# Patient Record
Sex: MALE | Race: OTHER | URBAN - METROPOLITAN AREA
[De-identification: names, ages, dates, MRNs, and addresses within clinical notes are randomized per-mention and may not be internally consistent; named-entity substitution may affect disease eponyms.]

---

## 2017-09-24 ENCOUNTER — HOSPITAL ENCOUNTER (EMERGENCY)
Age: 22
Discharge: HOME | End: 2017-09-24
Attending: EMERGENCY MEDICINE

## 2017-09-24 ENCOUNTER — APPOINTMENT (OUTPATIENT)
Dept: GENERAL RADIOLOGY | Age: 22
End: 2017-09-24

## 2017-09-24 VITALS
HEART RATE: 76 BPM | HEIGHT: 64 IN | SYSTOLIC BLOOD PRESSURE: 148 MMHG | DIASTOLIC BLOOD PRESSURE: 103 MMHG | WEIGHT: 260 LBS | OXYGEN SATURATION: 98 % | TEMPERATURE: 98.8 F | BODY MASS INDEX: 44.39 KG/M2

## 2017-09-24 DIAGNOSIS — S61.219A LACERATION OF FINGER, INITIAL ENCOUNTER: Primary | ICD-10-CM

## 2017-09-24 PROCEDURE — 12001 RPR S/N/AX/GEN/TRNK 2.5CM/<: CPT

## 2017-09-24 PROCEDURE — 99283 EMERGENCY DEPT VISIT LOW MDM: CPT

## 2017-09-24 PROCEDURE — 73130 X-RAY EXAM OF HAND: CPT

## 2017-09-24 RX ORDER — IBUPROFEN 600 MG/1
600 TABLET ORAL EVERY 6 HOURS PRN
Qty: 30 TABLET | Refills: 0 | Status: SHIPPED | OUTPATIENT
Start: 2017-09-24

## 2017-09-24 RX ORDER — LIDOCAINE HYDROCHLORIDE 10 MG/ML
20 INJECTION, SOLUTION INFILTRATION; PERINEURAL ONCE
Status: DISCONTINUED | OUTPATIENT
Start: 2017-09-24 | End: 2017-09-24 | Stop reason: HOSPADM

## 2017-09-24 ASSESSMENT — ENCOUNTER SYMPTOMS: COLOR CHANGE: 0

## 2017-09-24 ASSESSMENT — PAIN SCALES - GENERAL: PAINLEVEL_OUTOF10: 6

## 2017-09-24 ASSESSMENT — PAIN DESCRIPTION - PAIN TYPE: TYPE: ACUTE PAIN

## 2017-09-24 ASSESSMENT — PAIN DESCRIPTION - ORIENTATION: ORIENTATION: LEFT;MID

## 2017-09-24 ASSESSMENT — PAIN DESCRIPTION - LOCATION: LOCATION: FINGER (COMMENT WHICH ONE)

## 2024-07-03 ENCOUNTER — APPOINTMENT (OUTPATIENT)
Dept: CT IMAGING | Age: 29
End: 2024-07-03

## 2024-07-03 ENCOUNTER — HOSPITAL ENCOUNTER (INPATIENT)
Age: 29
LOS: 2 days | Discharge: HOME | End: 2024-07-06
Attending: EMERGENCY MEDICINE

## 2024-07-03 ENCOUNTER — APPOINTMENT (OUTPATIENT)
Dept: GENERAL RADIOLOGY | Age: 29
End: 2024-07-03

## 2024-07-03 DIAGNOSIS — R56.9 SEIZURE-LIKE ACTIVITY (HCC): Primary | ICD-10-CM

## 2024-07-03 DIAGNOSIS — F41.9 ANXIETY: ICD-10-CM

## 2024-07-03 LAB
ALBUMIN SERPL-MCNC: 5.1 G/DL (ref 3.5–5.2)
ALBUMIN/GLOB SERPL: 1 {RATIO} (ref 1–2.5)
ALP SERPL-CCNC: 126 U/L (ref 40–129)
ALT SERPL-CCNC: 10 U/L (ref 10–50)
AMPHET UR QL SCN: NEGATIVE
ANION GAP SERPL CALCULATED.3IONS-SCNC: 37 MMOL/L (ref 9–16)
APAP SERPL-MCNC: <5 UG/ML (ref 10–30)
AST SERPL-CCNC: 25 U/L (ref 10–50)
BACTERIA URNS QL MICRO: NORMAL
BARBITURATES UR QL SCN: NEGATIVE
BASOPHILS # BLD: 0.53 K/UL (ref 0–0.2)
BASOPHILS NFR BLD: 2 % (ref 0–2)
BENZODIAZ UR QL: NEGATIVE
BILIRUB SERPL-MCNC: 0.2 MG/DL (ref 0–1.2)
BILIRUB UR QL STRIP: NEGATIVE
BUN SERPL-MCNC: 12 MG/DL (ref 6–20)
CA-I BLD-SCNC: 1.37 MMOL/L (ref 1.13–1.33)
CALCIUM SERPL-MCNC: 10.3 MG/DL (ref 8.6–10.4)
CANNABINOIDS UR QL SCN: POSITIVE
CASTS #/AREA URNS LPF: NORMAL /LPF (ref 0–8)
CHLORIDE SERPL-SCNC: 101 MMOL/L (ref 98–107)
CK SERPL-CCNC: 164 U/L (ref 39–308)
CLARITY UR: CLEAR
CO2 SERPL-SCNC: 6 MMOL/L (ref 20–31)
COCAINE UR QL SCN: NEGATIVE
COLOR UR: YELLOW
CREAT SERPL-MCNC: 1.1 MG/DL (ref 0.7–1.2)
EOSINOPHIL # BLD: 0.27 K/UL (ref 0–0.4)
EOSINOPHILS RELATIVE PERCENT: 1 % (ref 1–4)
EPI CELLS #/AREA URNS HPF: NORMAL /HPF (ref 0–5)
ERYTHROCYTE [DISTWIDTH] IN BLOOD BY AUTOMATED COUNT: 12.9 % (ref 11.8–14.4)
ETHANOL PERCENT: <0.01 %
ETHANOLAMINE SERPL-MCNC: <10 MG/DL (ref 0–0.08)
FENTANYL UR QL: NEGATIVE
GFR, ESTIMATED: 90 ML/MIN/1.73M2
GLUCOSE BLD-MCNC: 236 MG/DL (ref 75–110)
GLUCOSE SERPL-MCNC: 261 MG/DL (ref 74–99)
GLUCOSE UR STRIP-MCNC: NEGATIVE MG/DL
HCT VFR BLD AUTO: 58.4 % (ref 40.7–50.3)
HGB BLD-MCNC: 17.4 G/DL (ref 13–17)
HGB UR QL STRIP.AUTO: ABNORMAL
IMM GRANULOCYTES # BLD AUTO: 0.27 K/UL (ref 0–0.3)
IMM GRANULOCYTES NFR BLD: 1 %
KETONES UR STRIP-MCNC: ABNORMAL MG/DL
LACTIC ACID, WHOLE BLOOD: 27 MMOL/L (ref 0.7–2.1)
LEUKOCYTE ESTERASE UR QL STRIP: NEGATIVE
LIPASE SERPL-CCNC: 15 U/L (ref 13–60)
LYMPHOCYTES NFR BLD: 7.69 K/UL (ref 1–4.8)
LYMPHOCYTES RELATIVE PERCENT: 29 % (ref 24–44)
MCH RBC QN AUTO: 27.8 PG (ref 25.2–33.5)
MCHC RBC AUTO-ENTMCNC: 29.8 G/DL (ref 28.4–34.8)
MCV RBC AUTO: 93.3 FL (ref 82.6–102.9)
METHADONE UR QL: NEGATIVE
MONOCYTES NFR BLD: 0.53 K/UL (ref 0.1–0.8)
MONOCYTES NFR BLD: 2 % (ref 1–7)
MORPHOLOGY: NORMAL
MYOGLOBIN SERPL-MCNC: 298 NG/ML (ref 28–72)
NEUTROPHILS NFR BLD: 65 % (ref 36–66)
NEUTS SEG NFR BLD: 17.21 K/UL (ref 1.8–7.7)
NITRITE UR QL STRIP: NEGATIVE
NRBC BLD-RTO: 0 PER 100 WBC
OPIATES UR QL SCN: NEGATIVE
OXYCODONE UR QL SCN: NEGATIVE
PCP UR QL SCN: NEGATIVE
PH UR STRIP: 5 [PH] (ref 5–8)
PLATELET # BLD AUTO: 513 K/UL (ref 138–453)
PMV BLD AUTO: 9.4 FL (ref 8.1–13.5)
POTASSIUM SERPL-SCNC: 3.6 MMOL/L (ref 3.7–5.3)
PROT SERPL-MCNC: 9.4 G/DL (ref 6.6–8.7)
PROT UR STRIP-MCNC: ABNORMAL MG/DL
RBC # BLD AUTO: 6.26 M/UL (ref 4.21–5.77)
RBC #/AREA URNS HPF: NORMAL /HPF (ref 0–4)
SALICYLATES SERPL-MCNC: <0.5 MG/DL (ref 0–10)
SODIUM SERPL-SCNC: 144 MMOL/L (ref 136–145)
SP GR UR STRIP: 1.01 (ref 1–1.03)
TEST INFORMATION: ABNORMAL
TROPONIN I SERPL HS-MCNC: 7 NG/L (ref 0–22)
UROBILINOGEN UR STRIP-ACNC: NORMAL EU/DL (ref 0–1)
WBC #/AREA URNS HPF: NORMAL /HPF (ref 0–5)
WBC OTHER # BLD: 26.5 K/UL (ref 3.5–11.3)

## 2024-07-03 PROCEDURE — 82330 ASSAY OF CALCIUM: CPT

## 2024-07-03 PROCEDURE — 81001 URINALYSIS AUTO W/SCOPE: CPT

## 2024-07-03 PROCEDURE — 80053 COMPREHEN METABOLIC PANEL: CPT

## 2024-07-03 PROCEDURE — 96372 THER/PROPH/DIAG INJ SC/IM: CPT

## 2024-07-03 PROCEDURE — 83874 ASSAY OF MYOGLOBIN: CPT

## 2024-07-03 PROCEDURE — 94761 N-INVAS EAR/PLS OXIMETRY MLT: CPT

## 2024-07-03 PROCEDURE — 80307 DRUG TEST PRSMV CHEM ANLYZR: CPT

## 2024-07-03 PROCEDURE — 93005 ELECTROCARDIOGRAM TRACING: CPT | Performed by: STUDENT IN AN ORGANIZED HEALTH CARE EDUCATION/TRAINING PROGRAM

## 2024-07-03 PROCEDURE — 99285 EMERGENCY DEPT VISIT HI MDM: CPT

## 2024-07-03 PROCEDURE — 5A1935Z RESPIRATORY VENTILATION, LESS THAN 24 CONSECUTIVE HOURS: ICD-10-PCS | Performed by: PSYCHIATRY & NEUROLOGY

## 2024-07-03 PROCEDURE — G0480 DRUG TEST DEF 1-7 CLASSES: HCPCS

## 2024-07-03 PROCEDURE — 82947 ASSAY GLUCOSE BLOOD QUANT: CPT

## 2024-07-03 PROCEDURE — 2500000003 HC RX 250 WO HCPCS

## 2024-07-03 PROCEDURE — 83605 ASSAY OF LACTIC ACID: CPT

## 2024-07-03 PROCEDURE — 2580000003 HC RX 258

## 2024-07-03 PROCEDURE — 96374 THER/PROPH/DIAG INJ IV PUSH: CPT

## 2024-07-03 PROCEDURE — 74177 CT ABD & PELVIS W/CONTRAST: CPT

## 2024-07-03 PROCEDURE — 6360000002 HC RX W HCPCS

## 2024-07-03 PROCEDURE — 2700000000 HC OXYGEN THERAPY PER DAY

## 2024-07-03 PROCEDURE — 0202U NFCT DS 22 TRGT SARS-COV-2: CPT

## 2024-07-03 PROCEDURE — 96376 TX/PRO/DX INJ SAME DRUG ADON: CPT

## 2024-07-03 PROCEDURE — 84484 ASSAY OF TROPONIN QUANT: CPT

## 2024-07-03 PROCEDURE — 6360000004 HC RX CONTRAST MEDICATION

## 2024-07-03 PROCEDURE — 0BH17EZ INSERTION OF ENDOTRACHEAL AIRWAY INTO TRACHEA, VIA NATURAL OR ARTIFICIAL OPENING: ICD-10-PCS | Performed by: PSYCHIATRY & NEUROLOGY

## 2024-07-03 PROCEDURE — 70450 CT HEAD/BRAIN W/O DYE: CPT

## 2024-07-03 PROCEDURE — 83690 ASSAY OF LIPASE: CPT

## 2024-07-03 PROCEDURE — 96375 TX/PRO/DX INJ NEW DRUG ADDON: CPT

## 2024-07-03 PROCEDURE — 85025 COMPLETE CBC W/AUTO DIFF WBC: CPT

## 2024-07-03 PROCEDURE — 71045 X-RAY EXAM CHEST 1 VIEW: CPT

## 2024-07-03 PROCEDURE — 82550 ASSAY OF CK (CPK): CPT

## 2024-07-03 PROCEDURE — 80179 DRUG ASSAY SALICYLATE: CPT

## 2024-07-03 PROCEDURE — 80143 DRUG ASSAY ACETAMINOPHEN: CPT

## 2024-07-03 RX ORDER — LORAZEPAM 2 MG/ML
INJECTION INTRAMUSCULAR
Status: DISPENSED
Start: 2024-07-03 | End: 2024-07-04

## 2024-07-03 RX ORDER — 0.9 % SODIUM CHLORIDE 0.9 %
30 INTRAVENOUS SOLUTION INTRAVENOUS ONCE
Status: COMPLETED | OUTPATIENT
Start: 2024-07-03 | End: 2024-07-03

## 2024-07-03 RX ORDER — PROPOFOL 10 MG/ML
5-50 INJECTION, EMULSION INTRAVENOUS CONTINUOUS
Status: DISCONTINUED | OUTPATIENT
Start: 2024-07-03 | End: 2024-07-04

## 2024-07-03 RX ORDER — PROPOFOL 10 MG/ML
INJECTION, EMULSION INTRAVENOUS
Status: COMPLETED
Start: 2024-07-03 | End: 2024-07-03

## 2024-07-03 RX ORDER — NALOXONE HYDROCHLORIDE 0.4 MG/ML
0.4 INJECTION, SOLUTION INTRAMUSCULAR; INTRAVENOUS; SUBCUTANEOUS ONCE
Status: DISCONTINUED | OUTPATIENT
Start: 2024-07-03 | End: 2024-07-04

## 2024-07-03 RX ADMIN — PROPOFOL 20 MCG/KG/MIN: 10 INJECTION, EMULSION INTRAVENOUS at 22:40

## 2024-07-03 RX ADMIN — LORAZEPAM 2 MG: 2 INJECTION INTRAMUSCULAR; INTRAVENOUS at 21:41

## 2024-07-03 RX ADMIN — LORAZEPAM 4 MG: 2 INJECTION INTRAMUSCULAR; INTRAVENOUS at 22:09

## 2024-07-03 RX ADMIN — Medication 50 MCG/HR: at 23:06

## 2024-07-03 RX ADMIN — LORAZEPAM 2 MG: 2 INJECTION INTRAMUSCULAR; INTRAVENOUS at 21:44

## 2024-07-03 RX ADMIN — IOPAMIDOL 75 ML: 755 INJECTION, SOLUTION INTRAVENOUS at 23:28

## 2024-07-03 RX ADMIN — FENTANYL CITRATE 100 MCG: 50 INJECTION, SOLUTION INTRAMUSCULAR; INTRAVENOUS at 23:46

## 2024-07-03 RX ADMIN — SUCCINYLCHOLINE CHLORIDE 120 MG: 20 INJECTION, SOLUTION INTRAMUSCULAR; INTRAVENOUS at 22:34

## 2024-07-03 RX ADMIN — ETOMIDATE INJECTION 20 MG: 2 SOLUTION INTRAVENOUS at 22:34

## 2024-07-03 RX ADMIN — LORAZEPAM 4 MG: 2 INJECTION INTRAMUSCULAR; INTRAVENOUS at 21:49

## 2024-07-03 RX ADMIN — SODIUM CHLORIDE 3402 ML: 9 INJECTION, SOLUTION INTRAVENOUS at 22:58

## 2024-07-03 ASSESSMENT — PULMONARY FUNCTION TESTS: PIF_VALUE: 22

## 2024-07-04 ENCOUNTER — APPOINTMENT (OUTPATIENT)
Dept: GENERAL RADIOLOGY | Age: 29
End: 2024-07-04

## 2024-07-04 PROBLEM — R56.9 SEIZURE-LIKE ACTIVITY (HCC): Status: ACTIVE | Noted: 2024-07-04

## 2024-07-04 LAB
ALBUMIN SERPL-MCNC: 3.9 G/DL (ref 3.5–5.2)
ALBUMIN/GLOB SERPL: 1 {RATIO} (ref 1–2.5)
ALP SERPL-CCNC: 89 U/L (ref 40–129)
ALT SERPL-CCNC: 8 U/L (ref 10–50)
ANION GAP SERPL CALCULATED.3IONS-SCNC: 12 MMOL/L (ref 9–16)
ANION GAP SERPL CALCULATED.3IONS-SCNC: 13 MMOL/L (ref 9–16)
AST SERPL-CCNC: 30 U/L (ref 10–50)
B PARAP IS1001 DNA NPH QL NAA+NON-PROBE: NOT DETECTED
B PERT DNA SPEC QL NAA+PROBE: NOT DETECTED
B-OH-BUTYR SERPL-MCNC: 0.15 MMOL/L (ref 0.02–0.27)
BASOPHILS # BLD: 0 K/UL (ref 0–0.2)
BASOPHILS NFR BLD: 0 % (ref 0–2)
BILIRUB SERPL-MCNC: <0.2 MG/DL (ref 0–1.2)
BUN SERPL-MCNC: 11 MG/DL (ref 6–20)
BUN SERPL-MCNC: 9 MG/DL (ref 6–20)
C PNEUM DNA NPH QL NAA+NON-PROBE: NOT DETECTED
CALCIUM SERPL-MCNC: 7.5 MG/DL (ref 8.6–10.4)
CALCIUM SERPL-MCNC: 8 MG/DL (ref 8.6–10.4)
CHLORIDE SERPL-SCNC: 107 MMOL/L (ref 98–107)
CHLORIDE SERPL-SCNC: 114 MMOL/L (ref 98–107)
CO2 SERPL-SCNC: 15 MMOL/L (ref 20–31)
CO2 SERPL-SCNC: 18 MMOL/L (ref 20–31)
CREAT SERPL-MCNC: 1 MG/DL (ref 0.7–1.2)
CREAT SERPL-MCNC: 1 MG/DL (ref 0.7–1.2)
EOSINOPHIL # BLD: 0.22 K/UL (ref 0–0.4)
EOSINOPHILS RELATIVE PERCENT: 1 % (ref 1–4)
ERYTHROCYTE [DISTWIDTH] IN BLOOD BY AUTOMATED COUNT: 13.2 % (ref 11.8–14.4)
FIO2: 100
FLUAV RNA NPH QL NAA+NON-PROBE: NOT DETECTED
FLUBV RNA NPH QL NAA+NON-PROBE: NOT DETECTED
GFR, ESTIMATED: >90 ML/MIN/1.73M2
GFR, ESTIMATED: >90 ML/MIN/1.73M2
GLUCOSE BLD-MCNC: 100 MG/DL (ref 75–110)
GLUCOSE BLD-MCNC: 146 MG/DL (ref 74–100)
GLUCOSE BLD-MCNC: 190 MG/DL (ref 74–100)
GLUCOSE BLD-MCNC: 85 MG/DL (ref 75–110)
GLUCOSE BLD-MCNC: 91 MG/DL (ref 75–110)
GLUCOSE BLD-MCNC: 95 MG/DL (ref 75–110)
GLUCOSE SERPL-MCNC: 181 MG/DL (ref 74–99)
GLUCOSE SERPL-MCNC: 89 MG/DL (ref 74–99)
HADV DNA NPH QL NAA+NON-PROBE: NOT DETECTED
HCO3 VENOUS: 15.8 MMOL/L (ref 22–29)
HCOV 229E RNA NPH QL NAA+NON-PROBE: NOT DETECTED
HCOV HKU1 RNA NPH QL NAA+NON-PROBE: NOT DETECTED
HCOV NL63 RNA NPH QL NAA+NON-PROBE: NOT DETECTED
HCOV OC43 RNA NPH QL NAA+NON-PROBE: NOT DETECTED
HCT VFR BLD AUTO: 47 % (ref 40.7–50.3)
HGB BLD-MCNC: 15.7 G/DL (ref 13–17)
HMPV RNA NPH QL NAA+NON-PROBE: NOT DETECTED
HPIV1 RNA NPH QL NAA+NON-PROBE: NOT DETECTED
HPIV2 RNA NPH QL NAA+NON-PROBE: NOT DETECTED
HPIV3 RNA NPH QL NAA+NON-PROBE: NOT DETECTED
HPIV4 RNA NPH QL NAA+NON-PROBE: NOT DETECTED
IMM GRANULOCYTES # BLD AUTO: 0.22 K/UL (ref 0–0.3)
IMM GRANULOCYTES NFR BLD: 1 %
LACTIC ACID, WHOLE BLOOD: 4.4 MMOL/L (ref 0.7–2.1)
LYMPHOCYTES NFR BLD: 2.2 K/UL (ref 1–4.8)
LYMPHOCYTES RELATIVE PERCENT: 10 % (ref 24–44)
M PNEUMO DNA NPH QL NAA+NON-PROBE: NOT DETECTED
MCH RBC QN AUTO: 28.2 PG (ref 25.2–33.5)
MCHC RBC AUTO-ENTMCNC: 33.4 G/DL (ref 28.4–34.8)
MCV RBC AUTO: 84.5 FL (ref 82.6–102.9)
MODE: ABNORMAL
MONOCYTES NFR BLD: 1.76 K/UL (ref 0.1–0.8)
MONOCYTES NFR BLD: 8 % (ref 1–7)
MORPHOLOGY: NORMAL
NEGATIVE BASE EXCESS, ART: 9 MMOL/L (ref 0–2)
NEGATIVE BASE EXCESS, VEN: 14.6 MMOL/L (ref 0–2)
NEUTROPHILS NFR BLD: 80 % (ref 36–66)
NEUTS SEG NFR BLD: 17.6 K/UL (ref 1.8–7.7)
NRBC BLD-RTO: 0 PER 100 WBC
O2 SAT, VEN: 85.1 % (ref 60–85)
PCO2 VENOUS: 52.9 MM HG (ref 41–51)
PH VENOUS: 7.08 (ref 7.32–7.43)
PLATELET # BLD AUTO: ABNORMAL K/UL (ref 138–453)
PLATELET, FLUORESCENCE: ABNORMAL K/UL (ref 138–453)
PO2 VENOUS: 69.3 MM HG (ref 30–50)
POC HCO3: 17.7 MMOL/L (ref 21–28)
POC O2 SATURATION: 99.9 % (ref 94–98)
POC PCO2: 40.2 MM HG (ref 35–48)
POC PH: 7.25 (ref 7.35–7.45)
POC PO2: 313.6 MM HG (ref 83–108)
POTASSIUM SERPL-SCNC: 3.8 MMOL/L (ref 3.7–5.3)
POTASSIUM SERPL-SCNC: 4.1 MMOL/L (ref 3.7–5.3)
PROT SERPL-MCNC: 6.7 G/DL (ref 6.6–8.7)
RBC # BLD AUTO: 5.56 M/UL (ref 4.21–5.77)
RSV RNA NPH QL NAA+NON-PROBE: NOT DETECTED
RV+EV RNA NPH QL NAA+NON-PROBE: NOT DETECTED
SARS-COV-2 RNA NPH QL NAA+NON-PROBE: NOT DETECTED
SODIUM SERPL-SCNC: 135 MMOL/L (ref 136–145)
SODIUM SERPL-SCNC: 144 MMOL/L (ref 136–145)
SPECIMEN DESCRIPTION: NORMAL
TROPONIN I SERPL HS-MCNC: 16 NG/L (ref 0–22)
TROPONIN I SERPL HS-MCNC: 36 NG/L (ref 0–22)
WBC OTHER # BLD: 22 K/UL (ref 3.5–11.3)

## 2024-07-04 PROCEDURE — 82803 BLOOD GASES ANY COMBINATION: CPT

## 2024-07-04 PROCEDURE — 2580000003 HC RX 258: Performed by: PSYCHIATRY & NEUROLOGY

## 2024-07-04 PROCEDURE — 6360000002 HC RX W HCPCS

## 2024-07-04 PROCEDURE — 82010 KETONE BODYS QUAN: CPT

## 2024-07-04 PROCEDURE — 2700000000 HC OXYGEN THERAPY PER DAY

## 2024-07-04 PROCEDURE — 94761 N-INVAS EAR/PLS OXIMETRY MLT: CPT

## 2024-07-04 PROCEDURE — 82947 ASSAY GLUCOSE BLOOD QUANT: CPT

## 2024-07-04 PROCEDURE — 84484 ASSAY OF TROPONIN QUANT: CPT

## 2024-07-04 PROCEDURE — 80053 COMPREHEN METABOLIC PANEL: CPT

## 2024-07-04 PROCEDURE — 95714 VEEG EA 12-26 HR UNMNTR: CPT

## 2024-07-04 PROCEDURE — 99291 CRITICAL CARE FIRST HOUR: CPT | Performed by: STUDENT IN AN ORGANIZED HEALTH CARE EDUCATION/TRAINING PROGRAM

## 2024-07-04 PROCEDURE — 95718 EEG PHYS/QHP 2-12 HR W/VEEG: CPT | Performed by: PSYCHIATRY & NEUROLOGY

## 2024-07-04 PROCEDURE — 85055 RETICULATED PLATELET ASSAY: CPT

## 2024-07-04 PROCEDURE — 71045 X-RAY EXAM CHEST 1 VIEW: CPT

## 2024-07-04 PROCEDURE — 87040 BLOOD CULTURE FOR BACTERIA: CPT

## 2024-07-04 PROCEDURE — 6360000002 HC RX W HCPCS: Performed by: PSYCHIATRY & NEUROLOGY

## 2024-07-04 PROCEDURE — 83605 ASSAY OF LACTIC ACID: CPT

## 2024-07-04 PROCEDURE — 95700 EEG CONT REC W/VID EEG TECH: CPT

## 2024-07-04 PROCEDURE — 85025 COMPLETE CBC W/AUTO DIFF WBC: CPT

## 2024-07-04 PROCEDURE — 51702 INSERT TEMP BLADDER CATH: CPT

## 2024-07-04 PROCEDURE — 2580000003 HC RX 258

## 2024-07-04 PROCEDURE — 2000000000 HC ICU R&B

## 2024-07-04 PROCEDURE — 36415 COLL VENOUS BLD VENIPUNCTURE: CPT

## 2024-07-04 PROCEDURE — 80048 BASIC METABOLIC PNL TOTAL CA: CPT

## 2024-07-04 PROCEDURE — 4A10X4Z MONITORING OF CENTRAL NERVOUS ELECTRICAL ACTIVITY, EXTERNAL APPROACH: ICD-10-PCS | Performed by: PSYCHIATRY & NEUROLOGY

## 2024-07-04 PROCEDURE — 74018 RADEX ABDOMEN 1 VIEW: CPT

## 2024-07-04 PROCEDURE — 36600 WITHDRAWAL OF ARTERIAL BLOOD: CPT

## 2024-07-04 PROCEDURE — 2500000003 HC RX 250 WO HCPCS

## 2024-07-04 RX ORDER — SODIUM CHLORIDE 9 MG/ML
INJECTION, SOLUTION INTRAVENOUS CONTINUOUS
Status: ACTIVE | OUTPATIENT
Start: 2024-07-04 | End: 2024-07-06

## 2024-07-04 RX ORDER — SODIUM CHLORIDE 9 MG/ML
INJECTION, SOLUTION INTRAVENOUS PRN
Status: DISCONTINUED | OUTPATIENT
Start: 2024-07-04 | End: 2024-07-06 | Stop reason: HOSPADM

## 2024-07-04 RX ORDER — INSULIN LISPRO 100 [IU]/ML
0-4 INJECTION, SOLUTION INTRAVENOUS; SUBCUTANEOUS
Status: DISCONTINUED | OUTPATIENT
Start: 2024-07-04 | End: 2024-07-05

## 2024-07-04 RX ORDER — ACETAMINOPHEN 325 MG/1
650 TABLET ORAL EVERY 6 HOURS PRN
Status: DISCONTINUED | OUTPATIENT
Start: 2024-07-04 | End: 2024-07-06 | Stop reason: HOSPADM

## 2024-07-04 RX ORDER — SUCCINYLCHOLINE CHLORIDE 20 MG/ML
120 INJECTION INTRAMUSCULAR; INTRAVENOUS ONCE
Status: COMPLETED | OUTPATIENT
Start: 2024-07-04 | End: 2024-07-03

## 2024-07-04 RX ORDER — MAGNESIUM SULFATE IN WATER 40 MG/ML
2000 INJECTION, SOLUTION INTRAVENOUS PRN
Status: DISCONTINUED | OUTPATIENT
Start: 2024-07-04 | End: 2024-07-06 | Stop reason: HOSPADM

## 2024-07-04 RX ORDER — ONDANSETRON 2 MG/ML
4 INJECTION INTRAMUSCULAR; INTRAVENOUS EVERY 6 HOURS PRN
Status: DISCONTINUED | OUTPATIENT
Start: 2024-07-04 | End: 2024-07-06 | Stop reason: HOSPADM

## 2024-07-04 RX ORDER — FENTANYL CITRATE 50 UG/ML
100 INJECTION, SOLUTION INTRAMUSCULAR; INTRAVENOUS ONCE
Status: DISCONTINUED | OUTPATIENT
Start: 2024-07-04 | End: 2024-07-04

## 2024-07-04 RX ORDER — THIAMINE HYDROCHLORIDE 100 MG/ML
100 INJECTION, SOLUTION INTRAMUSCULAR; INTRAVENOUS
Status: DISPENSED | OUTPATIENT
Start: 2024-07-04 | End: 2024-07-05

## 2024-07-04 RX ORDER — LORAZEPAM 2 MG/ML
2 INJECTION INTRAMUSCULAR ONCE
Status: DISCONTINUED | OUTPATIENT
Start: 2024-07-04 | End: 2024-07-04

## 2024-07-04 RX ORDER — DEXTROSE MONOHYDRATE 100 MG/ML
INJECTION, SOLUTION INTRAVENOUS CONTINUOUS PRN
Status: DISCONTINUED | OUTPATIENT
Start: 2024-07-04 | End: 2024-07-06 | Stop reason: HOSPADM

## 2024-07-04 RX ORDER — ONDANSETRON 4 MG/1
4 TABLET, ORALLY DISINTEGRATING ORAL EVERY 8 HOURS PRN
Status: DISCONTINUED | OUTPATIENT
Start: 2024-07-04 | End: 2024-07-06 | Stop reason: HOSPADM

## 2024-07-04 RX ORDER — POTASSIUM CHLORIDE 7.45 MG/ML
10 INJECTION INTRAVENOUS PRN
Status: DISCONTINUED | OUTPATIENT
Start: 2024-07-04 | End: 2024-07-06 | Stop reason: HOSPADM

## 2024-07-04 RX ORDER — GLUCAGON 1 MG/ML
1 KIT INJECTION PRN
Status: DISCONTINUED | OUTPATIENT
Start: 2024-07-04 | End: 2024-07-06 | Stop reason: HOSPADM

## 2024-07-04 RX ORDER — SODIUM CHLORIDE 0.9 % (FLUSH) 0.9 %
5-40 SYRINGE (ML) INJECTION PRN
Status: DISCONTINUED | OUTPATIENT
Start: 2024-07-04 | End: 2024-07-06 | Stop reason: HOSPADM

## 2024-07-04 RX ORDER — ENOXAPARIN SODIUM 100 MG/ML
30 INJECTION SUBCUTANEOUS 2 TIMES DAILY
Status: DISCONTINUED | OUTPATIENT
Start: 2024-07-04 | End: 2024-07-06 | Stop reason: HOSPADM

## 2024-07-04 RX ORDER — PROPOFOL 10 MG/ML
INJECTION, EMULSION INTRAVENOUS
Status: COMPLETED
Start: 2024-07-04 | End: 2024-07-04

## 2024-07-04 RX ORDER — MIDAZOLAM HYDROCHLORIDE 2 MG/2ML
5 INJECTION, SOLUTION INTRAMUSCULAR; INTRAVENOUS ONCE
Status: COMPLETED | OUTPATIENT
Start: 2024-07-04 | End: 2024-07-04

## 2024-07-04 RX ORDER — LORAZEPAM 2 MG/ML
4 INJECTION INTRAMUSCULAR ONCE
Status: DISCONTINUED | OUTPATIENT
Start: 2024-07-04 | End: 2024-07-04

## 2024-07-04 RX ORDER — ETOMIDATE 2 MG/ML
20 INJECTION INTRAVENOUS ONCE
Status: COMPLETED | OUTPATIENT
Start: 2024-07-04 | End: 2024-07-03

## 2024-07-04 RX ORDER — LORAZEPAM 2 MG/ML
4 INJECTION INTRAMUSCULAR ONCE
Status: COMPLETED | OUTPATIENT
Start: 2024-07-03 | End: 2024-07-03

## 2024-07-04 RX ORDER — LORAZEPAM 2 MG/ML
2 INJECTION INTRAMUSCULAR ONCE
Status: COMPLETED | OUTPATIENT
Start: 2024-07-03 | End: 2024-07-03

## 2024-07-04 RX ORDER — INSULIN LISPRO 100 [IU]/ML
0-4 INJECTION, SOLUTION INTRAVENOUS; SUBCUTANEOUS NIGHTLY
Status: DISCONTINUED | OUTPATIENT
Start: 2024-07-04 | End: 2024-07-05

## 2024-07-04 RX ORDER — POLYETHYLENE GLYCOL 3350 17 G/17G
17 POWDER, FOR SOLUTION ORAL DAILY PRN
Status: DISCONTINUED | OUTPATIENT
Start: 2024-07-04 | End: 2024-07-06 | Stop reason: HOSPADM

## 2024-07-04 RX ORDER — MIDAZOLAM HYDROCHLORIDE 5 MG/ML
INJECTION INTRAMUSCULAR; INTRAVENOUS
Status: DISPENSED
Start: 2024-07-04 | End: 2024-07-04

## 2024-07-04 RX ORDER — ACETAMINOPHEN 650 MG/1
650 SUPPOSITORY RECTAL EVERY 6 HOURS PRN
Status: DISCONTINUED | OUTPATIENT
Start: 2024-07-04 | End: 2024-07-06 | Stop reason: HOSPADM

## 2024-07-04 RX ORDER — POTASSIUM CHLORIDE 29.8 MG/ML
20 INJECTION INTRAVENOUS PRN
Status: DISCONTINUED | OUTPATIENT
Start: 2024-07-04 | End: 2024-07-06 | Stop reason: HOSPADM

## 2024-07-04 RX ORDER — SODIUM CHLORIDE 0.9 % (FLUSH) 0.9 %
5-40 SYRINGE (ML) INJECTION EVERY 12 HOURS SCHEDULED
Status: DISCONTINUED | OUTPATIENT
Start: 2024-07-04 | End: 2024-07-06 | Stop reason: HOSPADM

## 2024-07-04 RX ADMIN — MIDAZOLAM HYDROCHLORIDE 5 MG: 1 INJECTION, SOLUTION INTRAMUSCULAR; INTRAVENOUS at 00:36

## 2024-07-04 RX ADMIN — PROPOFOL 40 MCG/KG/MIN: 10 INJECTION, EMULSION INTRAVENOUS at 08:23

## 2024-07-04 RX ADMIN — SODIUM CHLORIDE 3000 MG: 900 INJECTION INTRAVENOUS at 00:43

## 2024-07-04 RX ADMIN — SODIUM CHLORIDE, PRESERVATIVE FREE 20 MG: 5 INJECTION INTRAVENOUS at 10:52

## 2024-07-04 RX ADMIN — ENOXAPARIN SODIUM 30 MG: 100 INJECTION SUBCUTANEOUS at 11:02

## 2024-07-04 RX ADMIN — PROPOFOL 50 MCG/KG/MIN: 10 INJECTION, EMULSION INTRAVENOUS at 01:34

## 2024-07-04 RX ADMIN — SODIUM CHLORIDE, PRESERVATIVE FREE 10 ML: 5 INJECTION INTRAVENOUS at 11:03

## 2024-07-04 RX ADMIN — PROPOFOL 35 MCG/KG/MIN: 10 INJECTION, EMULSION INTRAVENOUS at 04:44

## 2024-07-04 RX ADMIN — ENOXAPARIN SODIUM 30 MG: 100 INJECTION SUBCUTANEOUS at 21:07

## 2024-07-04 RX ADMIN — ENOXAPARIN SODIUM 30 MG: 100 INJECTION SUBCUTANEOUS at 03:34

## 2024-07-04 RX ADMIN — SODIUM CHLORIDE, PRESERVATIVE FREE 10 ML: 5 INJECTION INTRAVENOUS at 20:30

## 2024-07-04 RX ADMIN — SODIUM CHLORIDE: 9 INJECTION, SOLUTION INTRAVENOUS at 03:33

## 2024-07-04 ASSESSMENT — PULMONARY FUNCTION TESTS
PIF_VALUE: 22
PIF_VALUE: 21
PIF_VALUE: 22
PIF_VALUE: 22
PIF_VALUE: 26

## 2024-07-04 NOTE — ED PROVIDER NOTES
[facility] 1C STEPDOWN  Emergency Department Encounter  Emergency Medicine Resident     Pt Name:Kane Mijares  MRN: [id number]  Birthdate 1995  Date of evaluation: 7/3/24  PCP:  No primary care provider on file.  Note Started: 10:19 PM EDT      CHIEF COMPLAINT       Chief Complaint   Patient presents with    Altered Mental Status       HISTORY OF PRESENT ILLNESS  (Location/Symptom, Timing/Onset, Context/Setting, Quality, Duration, Modifying Factors, Severity.)      Kane Mijares is a 29 y.o. male who presents with altered mental status.  Unable to get initial history from EMS.  Patient unable to give a history as he is altered.  Was called to the room by nurse for altered mental status possible need for Narcan.  Given the room patient was seizing turned to his side with suction tube protecting for secretions.  2 mg of Ativan was given patient continued to be altered and agitated.  Nonrebreather was placed.  Per nurse patient was alert and oriented but lethargic when he first arrived.    PAST MEDICAL / SURGICAL / SOCIAL / FAMILY HISTORY      has a past medical history of Dog bite and Wrist injury.       has a past surgical history that includes other surgical history (Left, 11/28/2014).      Social History     Socioeconomic History    Marital status: Single     Spouse name: Not on file    Number of children: Not on file    Years of education: Not on file    Highest education level: Not on file   Occupational History    Not on file   Tobacco Use    Smoking status: Never    Smokeless tobacco: Never   Substance and Sexual Activity    Alcohol use: No    Drug use: No    Sexual activity: Not on file   Other Topics Concern    Not on file   Social History Narrative    Not on file     Social Determinants of Health     Financial Resource Strain: Not on file   Food Insecurity: Not on file   Transportation Needs: Not on file   Physical Activity: Not on file   Stress: Not on file   Social Connections: Not on file   Intimate

## 2024-07-04 NOTE — ED PROVIDER NOTES
[facility] ED     Emergency Department     Faculty Attestation    I performed a history and physical examination of the patient and discussed management with the resident. I reviewed the resident’s note and agree with the documented findings and plan of care. Any areas of disagreement are noted on the chart. I was personally present for the key portions of any procedures. I have documented in the chart those procedures where I was not present during the key portions. I have reviewed the emergency nurses triage note. I agree with the chief complaint, past medical history, past surgical history, allergies, medications, social and family history as documented unless otherwise noted below. For Physician Assistant/ Nurse Practitioner cases/documentation I have personally evaluated this patient and have completed at least one if not all key elements of the E/M (history, physical exam, and MDM). Additional findings are as noted.    Note Started: 10:13 PM EDT    Called to bedside patient arrived by EMS for altered mental status did receive Narcan for decreased responsiveness with improvement.  However just after arrival patient seized.  Approximately 90 seconds full body tonic-clonic did have some central cyanosis that improved with supplemental oxygen before we will apply pulse ox.  At this point patient has had multiple doses of IM and IV Ativan total of 12.  Still combative did require restraint for both patient and staff safety.  Normal blood sugar no history of seizures in chart no family here for additional information.    10:37 PM EDT  Patient continued to require demonstrate need for additional Ativan given this decision made to intubate to protect airway had still not regained any mental status concern for subclinical status.  Intubated single attempt video laryngoscope with RSI see resident note for details.  Will start propofol continue workup admit to ICU      Critical Care     CRITICAL  Mother calls back, stating she had not received the message about the dose increase. She is given this information and requests a refill be mailed to her home

## 2024-07-04 NOTE — ED NOTES
Pt presents to ED via LS1 for possible overdose. EMS states they were called out earlier today for pt being unresponsive, given narcan, then left AMA. Then at home, mother called EMS because pt was unresponsive again, given more narcan, was then alert and oriented. Pt arrives to ED alert and oriented but lethargic. Pt has episode of seizure, witnessed by staff. Dr. Bourgeois called to bedside.

## 2024-07-04 NOTE — PLAN OF CARE
Problem: Discharge Planning  Goal: Discharge to home or other facility with appropriate resources  7/4/2024 1749 by Rhianna Escamilla RN  Outcome: Progressing  7/4/2024 1748 by Rhianna Escamilla RN  Outcome: Progressing  7/4/2024 0547 by Matilde Cardenas RN  Outcome: Progressing     Problem: Safety - Adult  Goal: Free from fall injury  7/4/2024 1749 by Rhianna Escamilla RN  Outcome: Progressing  7/4/2024 1748 by Rhianna Escamilla RN  Outcome: Progressing  7/4/2024 0547 by Matilde Cardenas RN  Outcome: Progressing     Problem: Pain  Goal: Verbalizes/displays adequate comfort level or baseline comfort level  7/4/2024 1749 by Rhianna Escamilla RN  Outcome: Progressing  7/4/2024 1748 by Rhianna Escamilla RN  Outcome: Progressing  7/4/2024 0547 by Matilde Cardenas RN  Outcome: Progressing     Problem: Skin/Tissue Integrity  Goal: Absence of new skin breakdown  Description: 1.  Monitor for areas of redness and/or skin breakdown  2.  Assess vascular access sites hourly  3.  Every 4-6 hours minimum:  Change oxygen saturation probe site  4.  Every 4-6 hours:  If on nasal continuous positive airway pressure, respiratory therapy assess nares and determine need for appliance change or resting period.  7/4/2024 1749 by Rhianna Escamilla RN  Outcome: Progressing  7/4/2024 1748 by Rhianna Escamilla RN  Outcome: Progressing

## 2024-07-04 NOTE — ED PROVIDER NOTES
[facility]   Emergency Department  Emergency Medicine Attending Sign-out   Note started: 11:10 PM EDT    Care of Kane Mijares was assumed from previous attending Dr. Callejas at the and is being seen for Altered Mental Status  .  The patient's initial evaluation and plan have been discussed with the prior provider who initially evaluated the patient.     Attestation  I was available and discussed any additional care issues that arose and coordinated the management plans with the resident(s) caring for the patient during my duty period. Any areas of disagreement with resident's documentation of care or procedures are noted on the chart. I was personally present for the key portions of any/all procedures, during my duty period. I have documented in the chart those procedures where I was not present during the key portions.     BRIEF PATIENT SUMMARY/MDM COURSE PER INITIAL PROVIDER:   RECENT VITALS:     Temp: 98.2 °F (36.8 °C),  Pulse: (!) 135, Respirations: 24, BP: (!) 163/116, SpO2: 99 %    This patient is a 29 y.o. Male with initial mental status episode while in the car, but then was more alert and oriented, and signed AMA.  However there was a repeat episode of altered mental status, subsequently combated, they did give Narcan but did not seem to have any effect.  Patient had admitted to family to smoke marijuana, does not have a history of drug use otherwise.  Had been having a GI like illness.  Patient here was combative and on multiple rounds of Ativan, without any relief.  Therefore decision was made to intubate.  Currently on propofol.    DIAGNOSTICS/MEDICATIONS:     MEDICATIONS GIVEN:  ED Medication Orders (From admission, onward)      Start Ordered     Status Ordering Provider    07/03/24 2305 07/03/24 2305  iopamidol (ISOVUE-370) 76 % injection 75 mL  IMG ONCE PRN         Acknowledged AINSLEY CROOKS    07/03/24 2300 07/03/24 2257  propofol infusion  CONTINUOUS        Question

## 2024-07-04 NOTE — CARE COORDINATION
Case Management Assessment  Initial Evaluation    Date/Time of Evaluation: 7/4/2024 3:13 PM  Assessment Completed by: Megan Purcell RN    If patient is discharged prior to next notation, then this note serves as note for discharge by case management.    Patient Name: Kane Mijares                   YOB: 1995  Diagnosis: Seizure-like activity (HCC) [R56.9]                   Date / Time: 7/3/2024  9:35 PM    Patient Admission Status: Inpatient   Readmission Risk (Low < 19, Mod (19-27), High > 27): Readmission Risk Score: 10.8    Current PCP: No primary care provider on file.  PCP verified by CM? (P) No    Chart Reviewed: Yes      History Provided by: (P) Child/Family  Patient Orientation: (P) Other (see comment) (Did not particiate in assessment)    Patient Cognition: (P) Alert    Hospitalization in the last 30 days (Readmission):  No    If yes, Readmission Assessment in CM Navigator will be completed.    Advance Directives:      Code Status: Full Code   Patient's Primary Decision Maker is: (P) Patient Declined (Legal Next of Kin Remains as Decision Maker)      Discharge Planning:    Patient lives with: (P) Parent Type of Home: (P) House  Primary Care Giver: (P) Self  Patient Support Systems include: (P) Family Members, Parent   Current Financial resources: (P) HELP  Current community resources: (P) None  Current services prior to admission: (P) None            Current DME:              Type of Home Care services:  (P) None    ADLS  Prior functional level: (P) Independent in ADLs/IADLs  Current functional level: (P) Assistance with the following:, Bathing, Dressing, Toileting, Mobility    PT AM-PAC:   /24  OT AM-PAC:   /24    Family can provide assistance at DC: (P) Yes  Would you like Case Management to discuss the discharge plan with any other family members/significant others, and if so, who? (P) Yes (parents)  Plans to Return to Present Housing: (P) Yes  Other Identified Issues/Barriers to RETURNING

## 2024-07-04 NOTE — PROCEDURES
PROCEDURE NOTE  Date: 7/4/2024   Name: Kane Mijares  YOB: 1995    Procedures      LONG-TERM EEG-VIDEO MONITORING   CLINICAL NEUROPHYSIOLOGY LABORATORY  DEPARTMENT OF NEUROLOGY  [facility]     Patient: Kane Mijares  Age: 29 y.o.  MRN: [id number]    Referring Physician: No ref. provider found  History: The patient is a 29 y.o. male who presented breakthrough seizure/encephalopathy. This long-term video-EEG monitoring study was performed to determine the nature of the patient's clinical events. The patient is on neuroactive medications.   Kane Mijares   Current Facility-Administered Medications   Medication Dose Route Frequency Provider Last Rate Last Admin    fentaNYL (SUBLIMAZE) injection 100 mcg  100 mcg IntraVENous Once Mirza Caraballo DO        midazolam HCl (VERSED) 10 MG/2ML injection             thiamine (B-1) injection 100 mg  100 mg IntraVENous Once PRN Tolla, Edgar S, DO        dextrose bolus 10% 125 mL  125 mL IntraVENous PRN Tolla, Edgar S, DO        Or    dextrose bolus 10% 250 mL  250 mL IntraVENous PRN Tolla, Edgar S, DO        sodium chloride flush 0.9 % injection 5-40 mL  5-40 mL IntraVENous 2 times per day Tolla, Edgar S, DO        sodium chloride flush 0.9 % injection 5-40 mL  5-40 mL IntraVENous PRN Tolla, Edgar S, DO        0.9 % sodium chloride infusion   IntraVENous PRN Tolla, Edgar S, DO        potassium chloride 20 mEq/50 mL IVPB (Central Line)  20 mEq IntraVENous PRN Tolla, Edgar S, DO        Or    potassium chloride 10 mEq/100 mL IVPB (Peripheral Line)  10 mEq IntraVENous PRN Tolla, Edgar S, DO        magnesium sulfate 2000 mg in 50 mL IVPB premix  2,000 mg IntraVENous PRN Tolla, Edgar S, DO        enoxaparin Sodium (LOVENOX) injection 30 mg  30 mg SubCUTAneous BID Tolla, Edgar S, DO   30 mg at 07/04/24 0334    ondansetron (ZOFRAN-ODT) disintegrating tablet 4 mg  4 mg Oral Q8H PRN Tolla, Edgar S, DO        Or    ondansetron (ZOFRAN) injection 4 mg  4 mg IntraVENous Q6H PRN Tolla,

## 2024-07-04 NOTE — H&P
Neuro ICU History & Physical    Patient Name: Kane Mijares  Patient : 1995  Room/Bed: KASSIE/KASSIE  Code Status: Full  Allergies: No Known Allergies    CHIEF COMPLAINT     Lethargy and seizure-like activity    HPI    History Obtained From: Patient's family member    29-year-old male with past medical history of anxiety disorder was brought into ED by EMS after finding him very lethargic and weak.  As per the patient's stepmother, patient was fine till this evening and they both work together during the day.  After work, patient went to his car and then he called his father that he is having panic attack-like symptoms.  Father immediately called the stepmom and asked her to check him.  As she went to the car, she found him not responding to the commands and was not opening the car window and was locked inside.  People nearby called EMS because they thought that the patient passed out in the car.  EMS was somehow able to get the patient out of the car and found the patient to be very lethargic and not responding appropriately despite being alert.    At ED, he was found to have altered mentation with decreased responsiveness and did receive Narcan with minimal improvement.  He started being very agitated in the ED and had witnessed seizure-like activity with tonic-clonic and rigidity as per the ED physician but no bowel bladder incontinence and tongue bite. He required around 12 mg of Ativan and one-time 5 Mg IV midazolam and was intubated to protect airway.  But when we talk with his step mom, she does not think the patient was seizing but thinks that the rigidity was likely due to anxiety and combativeness. At presentation in the ED, his initial lactic acid was 27 which dropped down to 4.4.  UDS was positive for cannabinoids, WBC 26.5 likely reactive.  CT head negative for acute pathology. patient admitted to neuro ICU and LTME ordered.      Admitted to ICU From: ED  Reason for ICU Admission: Seizure-like  intact    Motor Exam:    Drift:  absent  Tone:  normal  Responding to pain despite being on fentanyl    Sensory:    Touch:    Withdrawing to pain, was on fentanyl drip during exam    Deep Tendon Reflexes:    Right Bicep:  2+  Left Bicep:  2+  Right Knee:  2+  Left Knee:  2+    Plantar Response:  Right:  downgoing  Left:  downgoing    Clonus:  N/A  Colindres's:  N/A    Coordination/Dysmetria:  Heel to Shin:  Could not be assessed  Finger to Nose:   Could not be assessed  Dysdiadochokinesia:  N/A    Gait: Deferred   SKIN No obvious ecchymosis, rashes, or lesions        LABS AND IMAGING:     RECENT LABS:  CBC with Differential:    Lab Results   Component Value Date/Time    WBC 26.5 07/03/2024 10:05 PM    RBC 6.26 07/03/2024 10:05 PM    HGB 17.4 07/03/2024 10:05 PM    HCT 58.4 07/03/2024 10:05 PM     07/03/2024 10:05 PM    MCV 93.3 07/03/2024 10:05 PM    MCH 27.8 07/03/2024 10:05 PM    MCHC 29.8 07/03/2024 10:05 PM    RDW 12.9 07/03/2024 10:05 PM    LYMPHOPCT 29 07/03/2024 10:05 PM    MONOPCT 2 07/03/2024 10:05 PM    EOSPCT 1 07/03/2024 10:05 PM    BASOPCT 2 07/03/2024 10:05 PM    MONOSABS 0.53 07/03/2024 10:05 PM    LYMPHSABS 7.69 07/03/2024 10:05 PM    EOSABS 0.27 07/03/2024 10:05 PM    BASOSABS 0.53 07/03/2024 10:05 PM    DIFFTYPE NOT REPORTED 11/27/2014 10:38 PM     BMP:    Lab Results   Component Value Date/Time     07/04/2024 12:33 AM    K 3.8 07/04/2024 12:33 AM     07/04/2024 12:33 AM    CO2 15 07/04/2024 12:33 AM    BUN 11 07/04/2024 12:33 AM    CREATININE 1.0 07/04/2024 12:33 AM    CALCIUM 7.5 07/04/2024 12:33 AM    GFRAA >60 11/27/2014 10:38 PM    LABGLOM >90 07/04/2024 12:33 AM    GLUCOSE 181 07/04/2024 12:33 AM       RADIOLOGY:   CT HEAD WO CONTRAST   Final Result   No acute intracranial abnormality.         CT CHEST ABDOMEN PELVIS W CONTRAST Additional Contrast? None   Final Result   1. Bibasilar airspace disease.   2. No acute intra-abdominal or pelvic process.         XR CHEST PORTABLE

## 2024-07-04 NOTE — PLAN OF CARE
Problem: Safety - Medical Restraint  Goal: Remains free of injury from restraints (Restraint for Interference with Medical Device)  Description: INTERVENTIONS:  1. Determine that other, less restrictive measures have been tried or would not be effective before applying the restraint  2. Evaluate the patient's condition at the time of restraint application  3. Inform patient/family regarding the reason for restraint  4. Q2H: Monitor safety, psychosocial status, comfort, nutrition and hydration  Outcome: Progressing  Flowsheets  Taken 7/4/2024 0400  Remains free of injury from restraints (restraint for interference with medical device):   Determine that other, less restrictive measures have been tried or would not be effective before applying the restraint   Evaluate the patient's condition at the time of restraint application   Inform patient/family regarding the reason for restraint   Every 2 hours: Monitor safety, psychosocial status, comfort, nutrition and hydration  Taken 7/4/2024 0200  Remains free of injury from restraints (restraint for interference with medical device):   Determine that other, less restrictive measures have been tried or would not be effective before applying the restraint   Evaluate the patient's condition at the time of restraint application   Inform patient/family regarding the reason for restraint   Every 2 hours: Monitor safety, psychosocial status, comfort, nutrition and hydration     Problem: Safety - Violent/Self-destructive Restraint  Goal: Remains free of injury from restraints (Restraint for Violent/Self-Destructive Behavior)  Description: INTERVENTIONS:  1. Determine that de-escalation and other, less restrictive measures have been tried or would not be effective before applying the restraint  2. Identify and document the criteria for restraint  3. Evaluate the patient's condition at the time of restraint application  4. Inform patient/family regarding the reason for

## 2024-07-05 ENCOUNTER — APPOINTMENT (OUTPATIENT)
Dept: MRI IMAGING | Age: 29
End: 2024-07-05

## 2024-07-05 LAB
ALBUMIN SERPL-MCNC: 3.8 G/DL (ref 3.5–5.2)
ALBUMIN/GLOB SERPL: 1 {RATIO} (ref 1–2.5)
ALP SERPL-CCNC: 93 U/L (ref 40–129)
ALT SERPL-CCNC: 7 U/L (ref 10–50)
ANION GAP SERPL CALCULATED.3IONS-SCNC: 15 MMOL/L (ref 9–16)
AST SERPL-CCNC: 30 U/L (ref 10–50)
BASOPHILS # BLD: 0.07 K/UL (ref 0–0.2)
BASOPHILS NFR BLD: 1 % (ref 0–2)
BILIRUB SERPL-MCNC: 0.4 MG/DL (ref 0–1.2)
BUN SERPL-MCNC: 6 MG/DL (ref 6–20)
CALCIUM SERPL-MCNC: 8.3 MG/DL (ref 8.6–10.4)
CHLORIDE SERPL-SCNC: 109 MMOL/L (ref 98–107)
CO2 SERPL-SCNC: 19 MMOL/L (ref 20–31)
CREAT SERPL-MCNC: 0.7 MG/DL (ref 0.7–1.2)
EKG ATRIAL RATE: 82 BPM
EKG P AXIS: 55 DEGREES
EKG P-R INTERVAL: 156 MS
EKG Q-T INTERVAL: 364 MS
EKG QRS DURATION: 108 MS
EKG QTC CALCULATION (BAZETT): 425 MS
EKG R AXIS: -28 DEGREES
EKG T AXIS: 31 DEGREES
EKG VENTRICULAR RATE: 82 BPM
EOSINOPHIL # BLD: 0.09 K/UL (ref 0–0.44)
EOSINOPHILS RELATIVE PERCENT: 1 % (ref 1–4)
ERYTHROCYTE [DISTWIDTH] IN BLOOD BY AUTOMATED COUNT: 13.2 % (ref 11.8–14.4)
GFR, ESTIMATED: >90 ML/MIN/1.73M2
GLUCOSE SERPL-MCNC: 92 MG/DL (ref 74–99)
HCT VFR BLD AUTO: 45.9 % (ref 40.7–50.3)
HGB BLD-MCNC: 14.6 G/DL (ref 13–17)
IMM GRANULOCYTES # BLD AUTO: 0.03 K/UL (ref 0–0.3)
IMM GRANULOCYTES NFR BLD: 0 %
LACTIC ACID, WHOLE BLOOD: 1 MMOL/L (ref 0.7–2.1)
LYMPHOCYTES NFR BLD: 1.81 K/UL (ref 1.1–3.7)
LYMPHOCYTES RELATIVE PERCENT: 16 % (ref 24–43)
MAGNESIUM SERPL-MCNC: 2.2 MG/DL (ref 1.6–2.6)
MCH RBC QN AUTO: 28 PG (ref 25.2–33.5)
MCHC RBC AUTO-ENTMCNC: 31.8 G/DL (ref 28.4–34.8)
MCV RBC AUTO: 87.9 FL (ref 82.6–102.9)
MONOCYTES NFR BLD: 0.96 K/UL (ref 0.1–1.2)
MONOCYTES NFR BLD: 9 % (ref 3–12)
NEUTROPHILS NFR BLD: 74 % (ref 36–65)
NEUTS SEG NFR BLD: 8.31 K/UL (ref 1.5–8.1)
NRBC BLD-RTO: 0 PER 100 WBC
PLATELET # BLD AUTO: 299 K/UL (ref 138–453)
PMV BLD AUTO: 8.7 FL (ref 8.1–13.5)
POTASSIUM SERPL-SCNC: 3.1 MMOL/L (ref 3.7–5.3)
PROT SERPL-MCNC: 6.9 G/DL (ref 6.6–8.7)
RBC # BLD AUTO: 5.22 M/UL (ref 4.21–5.77)
SODIUM SERPL-SCNC: 143 MMOL/L (ref 136–145)
WBC OTHER # BLD: 11.3 K/UL (ref 3.5–11.3)

## 2024-07-05 PROCEDURE — 83735 ASSAY OF MAGNESIUM: CPT

## 2024-07-05 PROCEDURE — 2580000003 HC RX 258: Performed by: PSYCHIATRY & NEUROLOGY

## 2024-07-05 PROCEDURE — 70553 MRI BRAIN STEM W/O & W/DYE: CPT

## 2024-07-05 PROCEDURE — 95714 VEEG EA 12-26 HR UNMNTR: CPT

## 2024-07-05 PROCEDURE — 36415 COLL VENOUS BLD VENIPUNCTURE: CPT

## 2024-07-05 PROCEDURE — 6370000000 HC RX 637 (ALT 250 FOR IP): Performed by: NURSE PRACTITIONER

## 2024-07-05 PROCEDURE — 2060000000 HC ICU INTERMEDIATE R&B

## 2024-07-05 PROCEDURE — 6360000002 HC RX W HCPCS: Performed by: PSYCHIATRY & NEUROLOGY

## 2024-07-05 PROCEDURE — 83605 ASSAY OF LACTIC ACID: CPT

## 2024-07-05 PROCEDURE — 94761 N-INVAS EAR/PLS OXIMETRY MLT: CPT

## 2024-07-05 PROCEDURE — 95720 EEG PHY/QHP EA INCR W/VEEG: CPT | Performed by: PSYCHIATRY & NEUROLOGY

## 2024-07-05 PROCEDURE — A9576 INJ PROHANCE MULTIPACK: HCPCS | Performed by: PSYCHIATRY & NEUROLOGY

## 2024-07-05 PROCEDURE — 99232 SBSQ HOSP IP/OBS MODERATE 35: CPT | Performed by: STUDENT IN AN ORGANIZED HEALTH CARE EDUCATION/TRAINING PROGRAM

## 2024-07-05 PROCEDURE — 6360000002 HC RX W HCPCS

## 2024-07-05 PROCEDURE — 6360000004 HC RX CONTRAST MEDICATION: Performed by: PSYCHIATRY & NEUROLOGY

## 2024-07-05 PROCEDURE — 99254 IP/OBS CNSLTJ NEW/EST MOD 60: CPT | Performed by: NURSE PRACTITIONER

## 2024-07-05 PROCEDURE — 85025 COMPLETE CBC W/AUTO DIFF WBC: CPT

## 2024-07-05 PROCEDURE — 6370000000 HC RX 637 (ALT 250 FOR IP)

## 2024-07-05 PROCEDURE — 2580000003 HC RX 258

## 2024-07-05 PROCEDURE — 80053 COMPREHEN METABOLIC PANEL: CPT

## 2024-07-05 RX ORDER — POTASSIUM CHLORIDE 20 MEQ/1
40 TABLET, EXTENDED RELEASE ORAL ONCE
Status: COMPLETED | OUTPATIENT
Start: 2024-07-05 | End: 2024-07-05

## 2024-07-05 RX ORDER — HYDRALAZINE HYDROCHLORIDE 20 MG/ML
5 INJECTION INTRAMUSCULAR; INTRAVENOUS EVERY 6 HOURS PRN
Status: DISCONTINUED | OUTPATIENT
Start: 2024-07-05 | End: 2024-07-06 | Stop reason: HOSPADM

## 2024-07-05 RX ORDER — BUSPIRONE HYDROCHLORIDE 5 MG/1
5 TABLET ORAL 2 TIMES DAILY
Status: DISCONTINUED | OUTPATIENT
Start: 2024-07-05 | End: 2024-07-06 | Stop reason: HOSPADM

## 2024-07-05 RX ORDER — ALPRAZOLAM 1 MG/1
1 TABLET ORAL NIGHTLY PRN
Status: DISCONTINUED | OUTPATIENT
Start: 2024-07-05 | End: 2024-07-06 | Stop reason: HOSPADM

## 2024-07-05 RX ADMIN — HYDRALAZINE HYDROCHLORIDE 5 MG: 20 INJECTION INTRAMUSCULAR; INTRAVENOUS at 18:16

## 2024-07-05 RX ADMIN — ENOXAPARIN SODIUM 30 MG: 100 INJECTION SUBCUTANEOUS at 22:36

## 2024-07-05 RX ADMIN — POTASSIUM CHLORIDE 40 MEQ: 1500 TABLET, EXTENDED RELEASE ORAL at 08:17

## 2024-07-05 RX ADMIN — GADOTERIDOL 20 ML: 279.3 INJECTION, SOLUTION INTRAVENOUS at 22:27

## 2024-07-05 RX ADMIN — SODIUM CHLORIDE: 9 INJECTION, SOLUTION INTRAVENOUS at 08:18

## 2024-07-05 RX ADMIN — BUSPIRONE HYDROCHLORIDE 5 MG: 5 TABLET ORAL at 18:16

## 2024-07-05 RX ADMIN — SODIUM CHLORIDE: 9 INJECTION, SOLUTION INTRAVENOUS at 00:04

## 2024-07-05 RX ADMIN — ENOXAPARIN SODIUM 30 MG: 100 INJECTION SUBCUTANEOUS at 08:17

## 2024-07-05 RX ADMIN — SODIUM CHLORIDE, PRESERVATIVE FREE 10 ML: 5 INJECTION INTRAVENOUS at 22:37

## 2024-07-05 ASSESSMENT — ENCOUNTER SYMPTOMS
SHORTNESS OF BREATH: 0
NAUSEA: 0
ABDOMINAL DISTENTION: 0
COLOR CHANGE: 0
BACK PAIN: 0
CONSTIPATION: 0
RHINORRHEA: 0
ABDOMINAL PAIN: 0
PHOTOPHOBIA: 0
DIARRHEA: 0
COUGH: 0

## 2024-07-05 NOTE — PROCEDURES
PROCEDURE NOTE  Date: 7/5/2024   Name: Kane Mijares  YOB: 1995    Procedures      LONG-TERM EEG-VIDEO MONITORING   CLINICAL NEUROPHYSIOLOGY LABORATORY  DEPARTMENT OF NEUROLOGY  [facility]     Patient: Kane Mijares  Age: 29 y.o.  MRN: [id number]    Referring Physician: No ref. provider found  History: The patient is a 29 y.o. male who presented breakthrough seizure/encephalopathy. This long-term video-EEG monitoring study was performed to determine the nature of the patient's clinical events. The patient is on neuroactive medications.   Kane Mijares   Current Facility-Administered Medications   Medication Dose Route Frequency Provider Last Rate Last Admin    dextrose bolus 10% 125 mL  125 mL IntraVENous PRN Tolla, Edgar S, DO        Or    dextrose bolus 10% 250 mL  250 mL IntraVENous PRN Tolla, Edgar S, DO        sodium chloride flush 0.9 % injection 5-40 mL  5-40 mL IntraVENous 2 times per day Tolla, Edgar S, DO   10 mL at 07/04/24 2030    sodium chloride flush 0.9 % injection 5-40 mL  5-40 mL IntraVENous PRN Tolla, Edgar S, DO        0.9 % sodium chloride infusion   IntraVENous PRN Tolla, Edgar S, DO        potassium chloride 20 mEq/50 mL IVPB (Central Line)  20 mEq IntraVENous PRN Tolla, Edgar S, DO        Or    potassium chloride 10 mEq/100 mL IVPB (Peripheral Line)  10 mEq IntraVENous PRN Tolla, Edgar S, DO        magnesium sulfate 2000 mg in 50 mL IVPB premix  2,000 mg IntraVENous PRN Tolla, Edgar S, DO        enoxaparin Sodium (LOVENOX) injection 30 mg  30 mg SubCUTAneous BID Tolla, Edgar S, DO   30 mg at 07/05/24 0817    ondansetron (ZOFRAN-ODT) disintegrating tablet 4 mg  4 mg Oral Q8H PRN Tolla, Edgar S, DO        Or    ondansetron (ZOFRAN) injection 4 mg  4 mg IntraVENous Q6H PRN Tolla, Edgar S, DO        polyethylene glycol (GLYCOLAX) packet 17 g  17 g Oral Daily PRN Tolla, Edgar S, DO        acetaminophen (TYLENOL) tablet 650 mg  650 mg Oral Q6H PRN Tolla, Edgar S, DO        Or    acetaminophen  frequency.  Sleep structures were seen with vertex waves, sleep spindles and K complexes.  The EKG channel revealed artifact abnormalities.  There was diffuse EEG and EKG lead artifact    Ictal EEG Recording / Patient Events: During this period the patient had no events or seizures.    Summary: During this day of recording no events were recorded. The interictal EEG was abnormal due to diffuse beta activity likely in the setting of medication use.  The diffuse EEG lead artifact limited the clarity of the EEG reading.  Monitoring was continued in order to record the patient's typical events. The EKG channel revealed lead abnormalities.    The tech was informed about lead artifact    Jonah Gerber MD  [facility] [place]

## 2024-07-05 NOTE — PLAN OF CARE
Problem: Discharge Planning  Goal: Discharge to home or other facility with appropriate resources  7/5/2024 1717 by Yvette Holguin, RN  Outcome: Progressing  Flowsheets (Taken 7/5/2024 1515)  Discharge to home or other facility with appropriate resources:   Identify barriers to discharge with patient and caregiver   Arrange for needed discharge resources and transportation as appropriate   Identify discharge learning needs (meds, wound care, etc)   Refer to discharge planning if patient needs post-hospital services based on physician order or complex needs related to functional status, cognitive ability or social support system  7/5/2024 0755 by Andrea Guzmán, RN  Outcome: Progressing  Flowsheets  Taken 7/5/2024 0400  Discharge to home or other facility with appropriate resources:   Identify barriers to discharge with patient and caregiver   Arrange for needed discharge resources and transportation as appropriate   Identify discharge learning needs (meds, wound care, etc)   Refer to discharge planning if patient needs post-hospital services based on physician order or complex needs related to functional status, cognitive ability or social support system  Taken 7/5/2024 0000  Discharge to home or other facility with appropriate resources:   Identify barriers to discharge with patient and caregiver   Arrange for needed discharge resources and transportation as appropriate   Identify discharge learning needs (meds, wound care, etc)   Refer to discharge planning if patient needs post-hospital services based on physician order or complex needs related to functional status, cognitive ability or social support system  Taken 7/4/2024 2000  Discharge to home or other facility with appropriate resources:   Identify barriers to discharge with patient and caregiver   Arrange for needed discharge resources and transportation as appropriate   Identify discharge learning needs (meds, wound care, etc)   Refer to discharge  Administer ordered medications to maintain glucose within target range   Assess barriers to adequate nutritional intake and initiate nutrition consult as needed   Instruct patient on self management of diabetes and initiate consult as needed     Problem: Hematologic - Adult  Goal: Maintains hematologic stability  7/5/2024 1717 by Yvette Holguin, RN  Outcome: Progressing  Flowsheets (Taken 7/5/2024 1515)  Maintains hematologic stability:   Assess for signs and symptoms of bleeding or hemorrhage   Monitor labs for bleeding or clotting disorders  7/5/2024 0755 by Andrea Guzmán, RN  Outcome: Progressing  Flowsheets  Taken 7/5/2024 0400  Maintains hematologic stability:   Assess for signs and symptoms of bleeding or hemorrhage   Monitor labs for bleeding or clotting disorders   Administer blood products/factors as ordered  Taken 7/5/2024 0000  Maintains hematologic stability:   Assess for signs and symptoms of bleeding or hemorrhage   Monitor labs for bleeding or clotting disorders   Administer blood products/factors as ordered  Taken 7/4/2024 2000  Maintains hematologic stability:   Assess for signs and symptoms of bleeding or hemorrhage   Monitor labs for bleeding or clotting disorders   Administer blood products/factors as ordered     Problem: Anxiety  Goal: Will report anxiety at manageable levels  Description: INTERVENTIONS:  1. Administer medication as ordered  2. Teach and rehearse alternative coping skills  3. Provide emotional support with 1:1 interaction with staff  7/5/2024 1717 by Yvette Holguin RN  Outcome: Progressing  Flowsheets (Taken 7/5/2024 1515)  Will report anxiety at manageable levels:   Administer medication as ordered   Teach and rehearse alternative coping skills   Provide emotional support with 1:1 interaction with staff  7/5/2024 0755 by Andrea Guzmán, RN  Outcome: Progressing  Flowsheets  Taken 7/5/2024 0400  Will report anxiety at manageable levels:   Teach and rehearse alternative

## 2024-07-05 NOTE — PLAN OF CARE
Problem: Discharge Planning  Goal: Discharge to home or other facility with appropriate resources  Outcome: Progressing  Flowsheets  Taken 7/5/2024 0400  Discharge to home or other facility with appropriate resources:   Identify barriers to discharge with patient and caregiver   Arrange for needed discharge resources and transportation as appropriate   Identify discharge learning needs (meds, wound care, etc)   Refer to discharge planning if patient needs post-hospital services based on physician order or complex needs related to functional status, cognitive ability or social support system  Taken 7/5/2024 0000  Discharge to home or other facility with appropriate resources:   Identify barriers to discharge with patient and caregiver   Arrange for needed discharge resources and transportation as appropriate   Identify discharge learning needs (meds, wound care, etc)   Refer to discharge planning if patient needs post-hospital services based on physician order or complex needs related to functional status, cognitive ability or social support system  Taken 7/4/2024 2000  Discharge to home or other facility with appropriate resources:   Identify barriers to discharge with patient and caregiver   Arrange for needed discharge resources and transportation as appropriate   Identify discharge learning needs (meds, wound care, etc)   Refer to discharge planning if patient needs post-hospital services based on physician order or complex needs related to functional status, cognitive ability or social support system     Problem: Safety - Adult  Goal: Free from fall injury  Outcome: Progressing  Flowsheets (Taken 7/4/2024 2000)  Free From Fall Injury: Instruct family/caregiver on patient safety     Problem: Pain  Goal: Verbalizes/displays adequate comfort level or baseline comfort level  Outcome: Progressing  Flowsheets  Taken 7/5/2024 0400  Verbalizes/displays adequate comfort level or baseline comfort level:   Encourage  range   Assess for signs of decreased cardiac output   Administer fluid and/or volume expanders as ordered   Administer vasoactive medications as ordered  Taken 7/4/2024 2000  Maintains optimal cardiac output and hemodynamic stability:   Monitor blood pressure and heart rate   Monitor urine output and notify Licensed Independent Practitioner for values outside of normal range   Administer fluid and/or volume expanders as ordered   Assess for signs of decreased cardiac output   Administer vasoactive medications as ordered  Goal: Absence of cardiac dysrhythmias or at baseline  Outcome: Progressing  Flowsheets  Taken 7/5/2024 0400  Absence of cardiac dysrhythmias or at baseline:   Monitor cardiac rate and rhythm   Assess for signs of decreased cardiac output   Administer antiarrhythmia medication and electrolyte replacement as ordered  Taken 7/5/2024 0000  Absence of cardiac dysrhythmias or at baseline:   Monitor cardiac rate and rhythm   Assess for signs of decreased cardiac output   Administer antiarrhythmia medication and electrolyte replacement as ordered  Taken 7/4/2024 2000  Absence of cardiac dysrhythmias or at baseline:   Monitor cardiac rate and rhythm   Assess for signs of decreased cardiac output   Administer antiarrhythmia medication and electrolyte replacement as ordered     Problem: Skin/Tissue Integrity - Adult  Goal: Skin integrity remains intact  Outcome: Progressing  Flowsheets  Taken 7/5/2024 0400  Skin Integrity Remains Intact:   Monitor for areas of redness and/or skin breakdown   Assess vascular access sites hourly  Taken 7/5/2024 0000  Skin Integrity Remains Intact:   Monitor for areas of redness and/or skin breakdown   Assess vascular access sites hourly  Taken 7/4/2024 2000  Skin Integrity Remains Intact:   Monitor for areas of redness and/or skin breakdown   Assess vascular access sites hourly  Goal: Incisions, wounds, or drain sites healing without S/S of infection  Outcome:

## 2024-07-05 NOTE — CONSULTS
Department of Psychiatry  Consult Service   Psychiatric Assessment        REASON FOR CONSULT: Panic attacks, depression, anxiety    CONSULTING PHYSICIAN: Felisha Marie    History obtained from: Neurology APC, EHR, nursing staff, patient    HISTORY OF PRESENT ILLNESS:          The patient is a 29 y.o. male with significant psychiatric history of anxiety and panic who is admitted medically to rule out new onset seizure versus panic attack.  According to medical documentation: This is a 29 y.o. male with past medical history of anxiety and panic attacks brought into ED after finding him lethargic with decreased responsiveness in the car.  At ED was very combative and agitated along with seizure-like activity of generalized tonic-clonic rigidity.  CT head negative for acute pathology.  Patient intubated in the ED. patient admitted to our service to rule out new onset seizure.  Currently on LTME.     Kane was extubated on 7/4/2024, neurology critical care reports that he has been extremely anxious and depressed, having 2 panic attacks per week.  They report that he has been tearful in his room.  To their knowledge she has never participated in any psychiatric treatment.  He is agreeable to engaging in today's interview, he reports that he has a general underlying anxiety and gets easily overwhelmed.  He reports that it peaks when he is \"alone\".  He reports when he starts thinking about things that is when he begins to get sick to his stomach and nauseous.  He reports that from there and tends to spin out of control.  He reports having heart palpitations and tends to isolate and focus on breathing.  He reports that one of the biggest triggers of the panic attack is past memories of his mom's passing in 2021.  He reports that she had COVID and pneumonia, he found her unresponsive and not breathing and started to perform CPR.  He reports that when the squad came they were not sympathetic and rude to the family.  He

## 2024-07-06 VITALS
RESPIRATION RATE: 28 BRPM | BODY MASS INDEX: 41.6 KG/M2 | TEMPERATURE: 98.1 F | SYSTOLIC BLOOD PRESSURE: 167 MMHG | DIASTOLIC BLOOD PRESSURE: 111 MMHG | HEIGHT: 66 IN | WEIGHT: 258.82 LBS | OXYGEN SATURATION: 88 % | HEART RATE: 94 BPM

## 2024-07-06 LAB
ALBUMIN SERPL-MCNC: 4 G/DL (ref 3.5–5.2)
ALBUMIN/GLOB SERPL: 1 {RATIO} (ref 1–2.5)
ALP SERPL-CCNC: 82 U/L (ref 40–129)
ALT SERPL-CCNC: 19 U/L (ref 10–50)
ANION GAP SERPL CALCULATED.3IONS-SCNC: 12 MMOL/L (ref 9–16)
AST SERPL-CCNC: 104 U/L (ref 10–50)
BASOPHILS # BLD: 0.08 K/UL (ref 0–0.2)
BASOPHILS NFR BLD: 1 % (ref 0–2)
BILIRUB SERPL-MCNC: 0.7 MG/DL (ref 0–1.2)
BUN SERPL-MCNC: 5 MG/DL (ref 6–20)
CALCIUM SERPL-MCNC: 8.7 MG/DL (ref 8.6–10.4)
CHLORIDE SERPL-SCNC: 104 MMOL/L (ref 98–107)
CO2 SERPL-SCNC: 22 MMOL/L (ref 20–31)
CREAT SERPL-MCNC: 0.6 MG/DL (ref 0.7–1.2)
EOSINOPHIL # BLD: 0.13 K/UL (ref 0–0.44)
EOSINOPHILS RELATIVE PERCENT: 1 % (ref 1–4)
ERYTHROCYTE [DISTWIDTH] IN BLOOD BY AUTOMATED COUNT: 13 % (ref 11.8–14.4)
GFR, ESTIMATED: >90 ML/MIN/1.73M2
GLUCOSE SERPL-MCNC: 98 MG/DL (ref 74–99)
HCT VFR BLD AUTO: 45.3 % (ref 40.7–50.3)
HGB BLD-MCNC: 14.2 G/DL (ref 13–17)
IMM GRANULOCYTES # BLD AUTO: 0.03 K/UL (ref 0–0.3)
IMM GRANULOCYTES NFR BLD: 0 %
LYMPHOCYTES NFR BLD: 2.11 K/UL (ref 1.1–3.7)
LYMPHOCYTES RELATIVE PERCENT: 21 % (ref 24–43)
MAGNESIUM SERPL-MCNC: 2 MG/DL (ref 1.6–2.6)
MCH RBC QN AUTO: 27.8 PG (ref 25.2–33.5)
MCHC RBC AUTO-ENTMCNC: 31.3 G/DL (ref 28.4–34.8)
MCV RBC AUTO: 88.6 FL (ref 82.6–102.9)
MONOCYTES NFR BLD: 0.95 K/UL (ref 0.1–1.2)
MONOCYTES NFR BLD: 10 % (ref 3–12)
NEUTROPHILS NFR BLD: 67 % (ref 36–65)
NEUTS SEG NFR BLD: 6.75 K/UL (ref 1.5–8.1)
NRBC BLD-RTO: 0 PER 100 WBC
PLATELET # BLD AUTO: 294 K/UL (ref 138–453)
PMV BLD AUTO: 8.5 FL (ref 8.1–13.5)
POTASSIUM SERPL-SCNC: 3.1 MMOL/L (ref 3.7–5.3)
PROT SERPL-MCNC: 6.8 G/DL (ref 6.6–8.7)
RBC # BLD AUTO: 5.11 M/UL (ref 4.21–5.77)
SODIUM SERPL-SCNC: 138 MMOL/L (ref 136–145)
WBC OTHER # BLD: 10.1 K/UL (ref 3.5–11.3)

## 2024-07-06 PROCEDURE — 6370000000 HC RX 637 (ALT 250 FOR IP)

## 2024-07-06 PROCEDURE — 95718 EEG PHYS/QHP 2-12 HR W/VEEG: CPT | Performed by: PSYCHIATRY & NEUROLOGY

## 2024-07-06 PROCEDURE — 6360000002 HC RX W HCPCS

## 2024-07-06 PROCEDURE — 99233 SBSQ HOSP IP/OBS HIGH 50: CPT | Performed by: PSYCHIATRY & NEUROLOGY

## 2024-07-06 PROCEDURE — 36415 COLL VENOUS BLD VENIPUNCTURE: CPT

## 2024-07-06 PROCEDURE — 6360000002 HC RX W HCPCS: Performed by: PSYCHIATRY & NEUROLOGY

## 2024-07-06 PROCEDURE — 95720 EEG PHY/QHP EA INCR W/VEEG: CPT | Performed by: PSYCHIATRY & NEUROLOGY

## 2024-07-06 PROCEDURE — 2580000003 HC RX 258: Performed by: PSYCHIATRY & NEUROLOGY

## 2024-07-06 PROCEDURE — 85025 COMPLETE CBC W/AUTO DIFF WBC: CPT

## 2024-07-06 PROCEDURE — 83735 ASSAY OF MAGNESIUM: CPT

## 2024-07-06 PROCEDURE — 80053 COMPREHEN METABOLIC PANEL: CPT

## 2024-07-06 PROCEDURE — 94761 N-INVAS EAR/PLS OXIMETRY MLT: CPT

## 2024-07-06 PROCEDURE — 95714 VEEG EA 12-26 HR UNMNTR: CPT

## 2024-07-06 RX ORDER — BUSPIRONE HYDROCHLORIDE 5 MG/1
5 TABLET ORAL 2 TIMES DAILY
Qty: 60 TABLET | Refills: 0 | Status: SHIPPED | OUTPATIENT
Start: 2024-07-06

## 2024-07-06 RX ORDER — AMLODIPINE BESYLATE 5 MG/1
5 TABLET ORAL DAILY
Qty: 30 TABLET | Refills: 3 | Status: SHIPPED | OUTPATIENT
Start: 2024-07-07

## 2024-07-06 RX ORDER — AMLODIPINE BESYLATE 5 MG/1
5 TABLET ORAL DAILY
Status: DISCONTINUED | OUTPATIENT
Start: 2024-07-06 | End: 2024-07-06 | Stop reason: HOSPADM

## 2024-07-06 RX ORDER — POTASSIUM CHLORIDE 20 MEQ/1
40 TABLET, EXTENDED RELEASE ORAL ONCE
Status: COMPLETED | OUTPATIENT
Start: 2024-07-06 | End: 2024-07-06

## 2024-07-06 RX ADMIN — POTASSIUM CHLORIDE 40 MEQ: 1500 TABLET, EXTENDED RELEASE ORAL at 08:21

## 2024-07-06 RX ADMIN — SODIUM CHLORIDE, PRESERVATIVE FREE 10 ML: 5 INJECTION INTRAVENOUS at 08:07

## 2024-07-06 RX ADMIN — ENOXAPARIN SODIUM 30 MG: 100 INJECTION SUBCUTANEOUS at 08:06

## 2024-07-06 RX ADMIN — BUSPIRONE HYDROCHLORIDE 5 MG: 5 TABLET ORAL at 08:06

## 2024-07-06 RX ADMIN — HYDRALAZINE HYDROCHLORIDE 5 MG: 20 INJECTION INTRAMUSCULAR; INTRAVENOUS at 04:31

## 2024-07-06 RX ADMIN — AMLODIPINE BESYLATE 5 MG: 5 TABLET ORAL at 08:24

## 2024-07-06 NOTE — PROCEDURES
PROCEDURE NOTE  Date: 7/6/2024   Name: Kane Mijares  YOB: 1995    Procedures      LONG-TERM EEG-VIDEO MONITORING   CLINICAL NEUROPHYSIOLOGY LABORATORY  DEPARTMENT OF NEUROLOGY  [facility]     Patient: Kane Mijares  Age: 29 y.o.  MRN: [id number]    Referring Physician: No ref. provider found  History: The patient is a 29 y.o. male who presented breakthrough seizure/encephalopathy. This long-term video-EEG monitoring study was performed to determine the nature of the patient's clinical events. The patient is on neuroactive medications.   Kane Mijares   Current Facility-Administered Medications   Medication Dose Route Frequency Provider Last Rate Last Admin    amLODIPine (NORVASC) tablet 5 mg  5 mg Oral Daily Lino Bolivar MD   5 mg at 07/06/24 0824    busPIRone (BUSPAR) tablet 5 mg  5 mg Oral BID Jm Tapia MD   5 mg at 07/06/24 0806    ALPRAZolam (XANAX) tablet 1 mg  1 mg Oral Nightly PRN Jm Tapia MD        hydrALAZINE (APRESOLINE) injection 5 mg  5 mg IntraVENous Q6H PRN Jm Tapia MD   5 mg at 07/06/24 0431    sodium chloride flush 0.9 % injection 5-40 mL  5-40 mL IntraVENous 2 times per day Tolla, Edgar S, DO   10 mL at 07/06/24 0807    sodium chloride flush 0.9 % injection 5-40 mL  5-40 mL IntraVENous PRN Tolla, Edgar S, DO        0.9 % sodium chloride infusion   IntraVENous PRN Tolla, Edgar S, DO        potassium chloride 20 mEq/50 mL IVPB (Central Line)  20 mEq IntraVENous PRN Tolla, Edgar S, DO        Or    potassium chloride 10 mEq/100 mL IVPB (Peripheral Line)  10 mEq IntraVENous PRN Tolla, Edgar S, DO        magnesium sulfate 2000 mg in 50 mL IVPB premix  2,000 mg IntraVENous PRN Tolla, Edgar S, DO        enoxaparin Sodium (LOVENOX) injection 30 mg  30 mg SubCUTAneous BID Tolla, Edgar S, DO   30 mg at 07/06/24 0806    ondansetron (ZOFRAN-ODT) disintegrating tablet 4 mg  4 mg Oral Q8H PRN Tolla, Edgar S, DO        Or    ondansetron (ZOFRAN) injection 4 mg  4 mg IntraVENous Q6H

## 2024-07-06 NOTE — PLAN OF CARE
Problem: Discharge Planning  Goal: Discharge to home or other facility with appropriate resources  7/6/2024 1153 by Seema Del Toro RN  Outcome: Progressing  7/6/2024 0633 by Megan Dominguez RN  Outcome: Progressing     Problem: Safety - Adult  Goal: Free from fall injury  7/6/2024 1153 by Seema Del Toro RN  Outcome: Progressing  7/6/2024 0633 by Megan Dominguez RN  Outcome: Progressing     Problem: Pain  Goal: Verbalizes/displays adequate comfort level or baseline comfort level  7/6/2024 0633 by Megan Dominguez RN  Outcome: Progressing

## 2024-07-06 NOTE — PROCEDURES
PROCEDURE NOTE  Date: 7/6/2024   Name: Kane Mijares  YOB: 1995    Procedures      LONG-TERM EEG-VIDEO MONITORING   CLINICAL NEUROPHYSIOLOGY LABORATORY  DEPARTMENT OF NEUROLOGY  [facility] [place]     Patient: Kane Mijares  Age: 29 y.o.  MRN: [id number]    Referring Physician: No ref. provider found  History: The patient is a 29 y.o. male who presented breakthrough seizure/encephalopathy. This long-term video-EEG monitoring study was performed to determine the nature of the patient's clinical events. The patient is on neuroactive medications.   Kane Mijares   Current Facility-Administered Medications   Medication Dose Route Frequency Provider Last Rate Last Admin    amLODIPine (NORVASC) tablet 5 mg  5 mg Oral Daily Lino Bolivar MD   5 mg at 07/06/24 0824    busPIRone (BUSPAR) tablet 5 mg  5 mg Oral BID Jm Tapia MD   5 mg at 07/06/24 0806    ALPRAZolam (XANAX) tablet 1 mg  1 mg Oral Nightly PRN Jm Tapia MD        hydrALAZINE (APRESOLINE) injection 5 mg  5 mg IntraVENous Q6H PRN Jm Tapia MD   5 mg at 07/06/24 0431    sodium chloride flush 0.9 % injection 5-40 mL  5-40 mL IntraVENous 2 times per day Tolla, Edgar S, DO   10 mL at 07/06/24 0807    sodium chloride flush 0.9 % injection 5-40 mL  5-40 mL IntraVENous PRN Tolla, Edgar S, DO        0.9 % sodium chloride infusion   IntraVENous PRN Tolla, Edgar S, DO        potassium chloride 20 mEq/50 mL IVPB (Central Line)  20 mEq IntraVENous PRN Tolla, Edgar S, DO        Or    potassium chloride 10 mEq/100 mL IVPB (Peripheral Line)  10 mEq IntraVENous PRN Tolla, Edgar S, DO        magnesium sulfate 2000 mg in 50 mL IVPB premix  2,000 mg IntraVENous PRN Tolla, Edgar S, DO        enoxaparin Sodium (LOVENOX) injection 30 mg  30 mg SubCUTAneous BID Tolla, Edgar S, DO   30 mg at 07/06/24 0806    ondansetron (ZOFRAN-ODT) disintegrating tablet 4 mg  4 mg Oral Q8H PRN Tolla, Edgar S, DO        Or    ondansetron (ZOFRAN) injection 4 mg  4 mg IntraVENous Q6H  rhythm admixed with alpha frequency.  Sleep structures were seen with vertex waves, sleep spindles and K complexes.  The EKG channel revealed artifact abnormalities.  There was diffuse EEG and EKG lead artifact    Ictal EEG Recording / Patient Events: During this period the patient had no events or seizures.    Summary: During this day of recording no events were recorded. The interictal EEG was abnormal due to mixture of alpha frequencies diffuse beta activity likely in the setting of medication use . The diffuse EEG lead artifact limited the clarity of the EEG reading.  Monitoring was continued in order to record the patient's typical events. The EKG channel revealed lead abnormalities.    The tech was informed about lead artifact    Jonah Gerber MD  [facility]

## 2024-07-06 NOTE — DISCHARGE SUMMARY
[facility]     Department of Neurology    INPATIENT DISCHARGE SUMMARY        Patient Identification:  Kane Mijares is a 29 y.o. male.  :  1995  MRN: [id number]     Acct: [id number]   Admit Date:  7/3/2024  Discharge date and time: No discharge date for patient encounter.   Attending Provider: Guilherme Guerrero MD                                     Admission Diagnoses:   Seizure-like activity (HCC) [R56.9]    Discharge Diagnoses:   Principal Problem:    Seizure-like activity (HCC)  Active Problems:    Unspecified mood (affective) disorder (HCC)  Resolved Problems:    * No resolved hospital problems. *       Consults:   Psychiatry    Brief Inpatient course:    The patient is a 28 yo male with history of anxiety disorder who presented to the ED with altered mental status concerning for possible overdose. Per noted, patient was found unresponsive, received narcan and transported via EMS to ER. While in the ED, patient had a 90 second tonic-clonic seizure like activity. He was given a total of 12mg Ativan due to agitation and then required intubation for airway protection. Patient admitted to Neuro ICU. Was extybated at .   Reports he has been depressed and anxious recently, reporting having typically 2 panic attacks per week. He does not have a PCP and has not seen anyone for this in the past. Does smoke cannabis twice daily, gets from a dispensary in [place]. LTME with no seizures . Lactic normalized, CT and MRI brain unremarkable.  Psychiatrist was consulted and an they recommended to start on buspirone 5 mg twice daily, with recommendation to consult to trauma recovery center for placement engagement psychotherapy.  To follow-up with psychiatry as outpatient  Due to hypertensive, patient was started on Norvasc 5 mg daily  Patient was discharged to establish with primary care within 1 week, follow-up with psychiatrist within 1 month and follow-up with a neurologist within 2

## 2024-07-06 NOTE — DISCHARGE INSTRUCTIONS
No driving for at least 6 months.  No heavy lifting for at least 6 months  No bathing or swimming unsupervised  Avoid locking doors, it prevents someone from helping you if needed  Avoid stairs unsupervised  Avoid cooking unsupervised  Follow up with your primary care physician, in 7 days  Follow-up with psychiatrist within 1 month  Follow up with Dr Joann Burton, in 2 months  Take all your medication as prescribed . If your prescription has refills, obtain the medication refills from the pharmacy before you run out. Seek medical attention if you run out of a medication you need and do not have any refills of.   Reasons to call your doctor or go to the ER: Seizure-like activity, weakness, numbness, or any other concerning symptoms.

## 2024-07-06 NOTE — CARE COORDINATION
Transitional Planning:   Spoke with pt , aware of d/c order. Denies any needs and states his ride is on their way.       Discharge Report    [facility]  Clinical Case Management Department  Written by: JENY MARTINEZ    Patient Name: Kane Mijares  Attending Provider: Guilherme Guerrero MD  Admit Date: 7/3/2024  9:35 PM  MRN: [id number]  Account: [id number]                     : 1995  Discharge Date:       Disposition: home    JENY MARTINEZ

## 2024-07-06 NOTE — PLAN OF CARE
Problem: Discharge Planning  Goal: Discharge to home or other facility with appropriate resources  7/6/2024 0633 by Megan Dominguez RN  Outcome: Progressing  7/5/2024 1717 by Yvette Holguin RN  Outcome: Progressing  Flowsheets (Taken 7/5/2024 1515)  Discharge to home or other facility with appropriate resources:   Identify barriers to discharge with patient and caregiver   Arrange for needed discharge resources and transportation as appropriate   Identify discharge learning needs (meds, wound care, etc)   Refer to discharge planning if patient needs post-hospital services based on physician order or complex needs related to functional status, cognitive ability or social support system     Problem: Safety - Adult  Goal: Free from fall injury  7/6/2024 0633 by Megan Dominguez RN  Outcome: Progressing  7/5/2024 1717 by Yvette Holguin RN  Outcome: Progressing     Problem: Pain  Goal: Verbalizes/displays adequate comfort level or baseline comfort level  7/6/2024 0633 by Megan Dominguez RN  Outcome: Progressing  7/5/2024 1717 by Yvette Holguin RN  Outcome: Progressing     Problem: Skin/Tissue Integrity  Goal: Absence of new skin breakdown  Description: 1.  Monitor for areas of redness and/or skin breakdown  2.  Assess vascular access sites hourly  3.  Every 4-6 hours minimum:  Change oxygen saturation probe site  4.  Every 4-6 hours:  If on nasal continuous positive airway pressure, respiratory therapy assess nares and determine need for appliance change or resting period.  7/6/2024 0633 by Megan Dominguez RN  Outcome: Progressing  7/5/2024 1717 by Yvette Holguin, RN  Outcome: Progressing     Problem: ABCDS Injury Assessment  Goal: Absence of physical injury  7/6/2024 0633 by Megan Dominguez RN  Outcome: Progressing  7/5/2024 1717 by Yvette Holguin RN  Outcome: Progressing     Problem: Neurosensory - Adult  Goal: Achieves stable or improved neurological status  7/6/2024 0633 by Alberto  1515)  Glucose maintained within prescribed range:   Monitor blood glucose as ordered   Assess for signs and symptoms of hyperglycemia and hypoglycemia   Administer ordered medications to maintain glucose within target range   Assess barriers to adequate nutritional intake and initiate nutrition consult as needed   Instruct patient on self management of diabetes and initiate consult as needed     Problem: Hematologic - Adult  Goal: Maintains hematologic stability  7/5/2024 1717 by Yvette Holguin, RN  Outcome: Progressing  Flowsheets (Taken 7/5/2024 1515)  Maintains hematologic stability:   Assess for signs and symptoms of bleeding or hemorrhage   Monitor labs for bleeding or clotting disorders     Problem: Anxiety  Goal: Will report anxiety at manageable levels  Description: INTERVENTIONS:  1. Administer medication as ordered  2. Teach and rehearse alternative coping skills  3. Provide emotional support with 1:1 interaction with staff  7/5/2024 1717 by Yvette Holguin, RN  Outcome: Progressing  Flowsheets (Taken 7/5/2024 1515)  Will report anxiety at manageable levels:   Administer medication as ordered   Teach and rehearse alternative coping skills   Provide emotional support with 1:1 interaction with staff

## 2024-07-07 LAB
MICROORGANISM SPEC CULT: NORMAL
MICROORGANISM SPEC CULT: NORMAL
SERVICE CMNT-IMP: NORMAL
SERVICE CMNT-IMP: NORMAL
SPECIMEN DESCRIPTION: NORMAL
SPECIMEN DESCRIPTION: NORMAL

## 2024-07-07 NOTE — PROGRESS NOTES
07/04/24 0817   Patient Observation   Pulse 98   Respirations 18   SpO2 100 %   Vent Information   Vent Mode (S)  CPAP/PS   Ventilator Settings   FiO2  40 %   Resp Rate (Set) 18 bpm   PEEP/CPAP (cmH2O) 5   Vt (Set, mL) 510 mL   Pressure Support (cm H2O) (S)  6 cm H2O   Vent Patient Data (Readings)   Vt (Measured) 478 mL   Peak Inspiratory Pressure (cmH2O) 22 cmH2O   Rate Measured 18 br/min   Minute Volume (L/min) 8.5 Liters   Mean Airway Pressure (cmH2O) 11.7 cmH20   Plateau Pressure (cm H2O) 31.9 cm H2O   Driving Pressure 26.9   I:E Ratio 1:2.7   I Time/ I Time % 0.9 s   Vent Alarm Settings   High Pressure (cmH2O) 40 cmH2O   Low Minute Volume (lpm) 5 L/min   RR High (bpm) 30 br/min       
CLINICAL PHARMACY NOTE: MEDS TO BEDS    Total # of Prescriptions Filled: 2   The following medications were delivered to the patient:  Amlodipine  buspirone    Additional Documentation:    
Date: 7/3/2024  Time: 2236  Patient identity confirmed:  Yes  Indications: unresponsive  Preoxygenation: BVM with 100% O2  Laryngoscope size and type Glidescope  Airway introducer used: No  Evac: No  Tube size:a 7.5 cuffed  Number of attempts:1   Cords visualized:  [x] Clearly  [] Poorly  Breath sounds present bilaterally: Yes   ETCO2   [x] Positive   Tube secured at 24 at lip  Chest x-ray ordered: Yes  BP: BP: (!) 165/87    Notes: no complications     Procedure performed by: Dr. Ilya Rodriguez RCP  10:48 PM  
[facility]  PROGRESS NOTE    Shift date: 07/05/2024  Shift day: Friday   Shift # 2    Room # 0137/0137-01   Name: Kane Mijares                Latter-day: Orthodox   Place of Anglican: Unknown    Referral: Routine Visit    Admit Date & Time: 7/3/2024  9:35 PM    Assessment:  Kane Mijares is a 29 y.o. male in the hospital because seizure like activity. Upon entering the room writer observes patient sitting up in bed, with father in chair bedside.      Intervention:  Writer introduced self and title as  Lawrence, from Spiritual Care.  Writer offered space for patient and father  to express feelings, needs, and concerns and provided a ministry presence. Father expressed patient's anxiety and wish to return home.  States that patient is very frustrated.  Patient appears anxious and resistant to engaging in conversation.  After some conversation, patient shares pictures of his dog with writer and states how much he misses her.     Outcome:  Patient was somewhat receptive to visit.  Father expressed gratitude for visit.    Plan:  Chaplains will remain available to offer spiritual and emotional support as needed.     07/05/24 1908   Encounter Summary   Encounter Overview/Reason Initial Encounter   Service Provided For Patient and family together   Referral/Consult From Other    Support System Family members   Last Encounter  07/05/24   Complexity of Encounter Moderate   Begin Time 1855   End Time  1910   Total Time Calculated 15 min   Crisis   Type Follow up;Family Care   Spiritual/Emotional needs   Type Emotional Distress   Assessment/Intervention/Outcome   Assessment Angry;Anxious;Passive   Intervention Active listening;Sustaining Presence/Ministry of presence   Outcome Venting emotion           Electronically signed by LAWRENCE SAHU, Intern, on 7/5/2024 at 7:10 PM.  [facility]  [phone]     
[facility]   IN-PATIENT SERVICE   [facility]    HISTORY AND PHYSICAL EXAMINATION            Date:   7/5/2024  Patient name:  Kane Mijares  Date of admission:  7/3/2024  9:35 PM  MRN:   [id number]  Account:  [id number]  YOB: 1995  PCP:    No primary care provider on file.  Room:   [address] [place] [address]  Code Status:    Full Code    Chief Complaint:     Chief Complaint   Patient presents with    Altered Mental Status       History Obtained From:     patient, electronic medical record    History of Present Illness:     Initial Presentation (Admitted 7/4/24):  The patient is a 30 yo male with history of anxiety disorder who presented to the ED with altered mental status concerning for possible overdose. Per noted, patient was found unresponsive, received narcan and transported via EMS to ER. While in the ED, patient had a 90 second tonic-clonic seizure like activity. He was given a total of 12mg Ativan due to agitation and then required intubation for airway protection. Patient admitted to Neuro ICU. Was extybated at 7/4.     Reports he has been depressed and anxious recently, reporting having typically 2 panic attacks per week. He does not have a PCP and has not seen anyone for this in the past. Does smoke cannabis twice daily, gets from a dispensary in [place]. LTME with no seizures overnight. Lactic normalized, will decrease fluids this morning and consider discontinuing if tolerating adequate PO throughout the day.     Past Medical History:     Past Medical History:   Diagnosis Date    Dog bite 11/2014    Wrist injury     injuried growth plate lt wrist        Past Surgical History:     Past Surgical History:   Procedure Laterality Date    OTHER SURGICAL HISTORY Left 11/28/2014    I & D elbow s/p dog bite        Medications Prior to Admission:     Prior to Admission medications    Not on File        Allergies:     Patient has no known allergies.    Social History:     Tobacco:    
Order obtained for extubation.  SpO2 of 97 on 30% FiO2.   Patient extubated and placed on 2 liters/min via nasal cannula.   Post extubation SpO2 is 97% with HR  104 bpm and RR 20 breaths/min.    Patient had strong cough that was productive of white sputum.  Extubation Well tolerated by patient..   Breath Sounds: clear    ELIZA SCHAEFER RCP   2:56 PM  
Violent Restraint Face-to-Face Evaluation  (must be completed within one hour of initiation of restraints)      Evaluate immediate situation: Patient is agitated a harm to himself and others    Reaction to intervention: Patient became more agitated after Ativan was given    Medical Condition/Assessment: Acute altered mental status postictal    Behavioral Condition/Assessment: Patient is combative and altered    The patient’s review of systems, history, medications, and recent labs were reviewed at this time.     Continue/Discontinue restraints at this time: Discontinue    One-Hour Review Evaluation Physician Notification:    Provider Notified (Name):  Mirza Caraballo DO       Date 7/3/2024     time 10:30 PM      Date 7/3/2024 time 10:30 PM    Patient continued to be combative and altered unable to protect airway patient was intubated.  Violent restraints were discontinued for soft bilateral restraints for intubation.     Physician or Designated One-Hour Reviewer  Mirza Caraballo DO          
responsiveness in the car.  At ED was very combative and agitated along with seizure-like activity of generalized tonic-clonic rigidity.  CT head negative for acute pathology.  Patient intubated in the ED. patient admitted to our service to rule out new onset seizure.  Currently on LTME. Extubated 7/4    NEUROLOGIC:  - New onset seizure like activity  - CT head negative on admission  - LTME negative for seizure to date, recommend a couple more days of monitoring  - Monitor off AEDs  - Follow up MRI brain w wo contrast  - Goal normotensive  - Neuro checks per protocol    CARDIOVASCULAR:  - Goal normotension  - Persistently hypertensive, tachycardic at times  - Recommend outpatient PCP follow up to evaluate for primary/secondary HTN  - Continue telemetry    PULMONARY:  - Intubated for airway protection  - Extubated 7/4  - Tolerating room air    RENAL/FLUID/ELECTROLYTE:  - BUN 6/ Creatinine 0.7  - Monitor I&Os  - Lactic acidosis resolved  - IVF: Decrease normal saline to 75mL/hr  - Replace electrolytes PRN  - Daily BMP    GI/NUTRITION:  NUTRITION:  ADULT DIET; Regular; 4 carb choices (60 gm/meal)  - Bowel regimen: miralax PRN  - GI prophylaxis: Not indicated    ID:  - Tmax 37.1  - WBC 11.3, down from 22  - UA negative  - Continue to monitor for fevers  - Daily CBC    HEME:   - H&H 14.6/45.9  - Platelets 299  - Daily CBC    ENDOCRINE:  - Continue to monitor blood glucose, goal <180    OTHER:  - PT/OT  - Code Status: Full  - Psych consult to discuss depression/anxiety    PROPHYLAXIS:  Stress ulcer: Not indicated    DVT PROPHYLAXIS:  - SCD sleeves - Thigh High   - Lovenox    DISPOSITION:  [] To remain ICU: **  [x] OK for out of ICU from Neuro Critical Care standpoint - will transfer to neuro stepdown    For any changes in exam or patient status please contact Neuro Critical Care.      Summer Guy, APRN - CNP  Neuro Critical Care  7/5/2024     7:11 AM        
follow-up with psychiatry as outpatient  -On xanax 1 mg nightly as needed  -Hypertensive, added amlodipine 5 mg daily, on hydralazine 5 mg every 6 as needed if systolic blood pressure above 150  -Anticipate discharge today    DVT prophylaxis: Lovenox  GI prophylaxis: Not indicated  Diet: Regular  Disposition: Home    OT/PT/SW    Code Status: Full      Follow-up further recommendations after discussing the case with attending  The plan was discussed with the patient, patient's family and the medical staff.   Consultations:   IP CONSULT TO NEUROCRITICAL CARE  IP CONSULT TO PSYCHIATRY  IP CONSULT TO NEUROLOGY    Patient is admitted as inpatient status because of co-morbidities listed above, severity of signs and symptoms as outlined, requirement for current medical therapies and most importantly because of direct risk to patient if care not provided in a hospital setting.    Lino Burton MD  7/6/2024  10:14 AM    Copy sent to Dr. Barton primary care provider on file.

## 2024-08-03 ENCOUNTER — HOSPITAL ENCOUNTER (INPATIENT)
Age: 29
LOS: 1 days | Discharge: HOME | DRG: 101 | End: 2024-08-04
Admitting: INTERNAL MEDICINE

## 2024-08-03 ENCOUNTER — APPOINTMENT (OUTPATIENT)
Dept: GENERAL RADIOLOGY | Age: 29
DRG: 101 | End: 2024-08-03

## 2024-08-03 DIAGNOSIS — R56.9 SEIZURES (HCC): Primary | ICD-10-CM

## 2024-08-03 PROBLEM — I10 HYPERTENSION: Status: ACTIVE | Noted: 2024-08-03

## 2024-08-03 PROBLEM — D72.829 LEUKOCYTOSIS: Status: ACTIVE | Noted: 2024-08-03

## 2024-08-03 LAB
ALBUMIN SERPL-MCNC: 4 G/DL (ref 3.5–5.2)
ALP SERPL-CCNC: 106 U/L (ref 40–129)
ALT SERPL-CCNC: 14 U/L (ref 5–41)
AMPHET UR QL SCN: NEGATIVE
ANION GAP SERPL CALCULATED.3IONS-SCNC: 21 MMOL/L (ref 9–17)
AST SERPL-CCNC: 18 U/L
BACTERIA URNS QL MICRO: ABNORMAL
BARBITURATES UR QL SCN: NEGATIVE
BASOPHILS # BLD: 0.11 K/UL (ref 0–0.2)
BASOPHILS NFR BLD: 1 % (ref 0–2)
BENZODIAZ UR QL: NEGATIVE
BILIRUB SERPL-MCNC: 0.2 MG/DL (ref 0.3–1.2)
BILIRUB UR QL STRIP: NEGATIVE
BUN SERPL-MCNC: 11 MG/DL (ref 6–20)
BUN/CREAT SERPL: 16 (ref 9–20)
CALCIUM SERPL-MCNC: 9.1 MG/DL (ref 8.6–10.4)
CANNABINOIDS UR QL SCN: POSITIVE
CHLORIDE SERPL-SCNC: 99 MMOL/L (ref 98–107)
CLARITY UR: CLEAR
CO2 SERPL-SCNC: 16 MMOL/L (ref 20–31)
COCAINE UR QL SCN: NEGATIVE
COLOR UR: YELLOW
CREAT SERPL-MCNC: 0.7 MG/DL (ref 0.7–1.2)
CRP SERPL HS-MCNC: 16.3 MG/L (ref 0–5)
EOSINOPHIL # BLD: 0.03 K/UL (ref 0–0.44)
EOSINOPHILS RELATIVE PERCENT: 0 % (ref 1–4)
EPI CELLS #/AREA URNS HPF: ABNORMAL /HPF (ref 0–5)
ERYTHROCYTE [DISTWIDTH] IN BLOOD BY AUTOMATED COUNT: 12.7 % (ref 11.8–14.4)
ERYTHROCYTE [SEDIMENTATION RATE] IN BLOOD BY PHOTOMETRIC METHOD: 47 MM/HR (ref 0–15)
ETHANOL PERCENT: <0.01 %
ETHANOLAMINE SERPL-MCNC: <10 MG/DL (ref 0–0.08)
FENTANYL UR QL: NEGATIVE
GFR, ESTIMATED: >90 ML/MIN/1.73M2
GLUCOSE SERPL-MCNC: 145 MG/DL (ref 70–99)
GLUCOSE UR STRIP-MCNC: NEGATIVE MG/DL
HCT VFR BLD AUTO: 47.1 % (ref 40.7–50.3)
HGB BLD-MCNC: 15.4 G/DL (ref 13–17)
HGB UR QL STRIP.AUTO: ABNORMAL
IMM GRANULOCYTES # BLD AUTO: 0.11 K/UL (ref 0–0.3)
IMM GRANULOCYTES NFR BLD: 1 %
KETONES UR STRIP-MCNC: NEGATIVE MG/DL
LEUKOCYTE ESTERASE UR QL STRIP: NEGATIVE
LYMPHOCYTES NFR BLD: 2.36 K/UL (ref 1.1–3.7)
LYMPHOCYTES RELATIVE PERCENT: 13 % (ref 24–43)
MAGNESIUM SERPL-MCNC: 2.3 MG/DL (ref 1.6–2.6)
MCH RBC QN AUTO: 28.4 PG (ref 25.2–33.5)
MCHC RBC AUTO-ENTMCNC: 32.7 G/DL (ref 28.4–34.8)
MCV RBC AUTO: 86.9 FL (ref 82.6–102.9)
METHADONE UR QL: NEGATIVE
MONOCYTES NFR BLD: 0.68 K/UL (ref 0.1–1.2)
MONOCYTES NFR BLD: 4 % (ref 3–12)
MUCOUS THREADS URNS QL MICRO: ABNORMAL
NEUTROPHILS NFR BLD: 81 % (ref 36–65)
NEUTS SEG NFR BLD: 14.86 K/UL (ref 1.5–8.1)
NITRITE UR QL STRIP: NEGATIVE
NRBC BLD-RTO: 0 PER 100 WBC
OPIATES UR QL SCN: NEGATIVE
OXYCODONE UR QL SCN: NEGATIVE
PCP UR QL SCN: NEGATIVE
PH UR STRIP: 5.5 [PH] (ref 5–8)
PLATELET # BLD AUTO: 411 K/UL (ref 138–453)
PMV BLD AUTO: 9 FL (ref 8.1–13.5)
POTASSIUM SERPL-SCNC: 4.3 MMOL/L (ref 3.7–5.3)
PROT SERPL-MCNC: 7.9 G/DL (ref 6.4–8.3)
PROT UR STRIP-MCNC: ABNORMAL MG/DL
RBC # BLD AUTO: 5.42 M/UL (ref 4.21–5.77)
RBC #/AREA URNS HPF: ABNORMAL /HPF (ref 0–2)
SODIUM SERPL-SCNC: 136 MMOL/L (ref 135–144)
SP GR UR STRIP: 1.02 (ref 1–1.03)
TEST INFORMATION: ABNORMAL
TROPONIN I SERPL HS-MCNC: <6 NG/L (ref 0–22)
UROBILINOGEN UR STRIP-ACNC: NORMAL EU/DL (ref 0–1)
WBC #/AREA URNS HPF: ABNORMAL /HPF (ref 0–5)
WBC OTHER # BLD: 18.2 K/UL (ref 3.5–11.3)

## 2024-08-03 PROCEDURE — 84146 ASSAY OF PROLACTIN: CPT

## 2024-08-03 PROCEDURE — 2580000003 HC RX 258

## 2024-08-03 PROCEDURE — 6360000002 HC RX W HCPCS: Performed by: NURSE PRACTITIONER

## 2024-08-03 PROCEDURE — 86140 C-REACTIVE PROTEIN: CPT

## 2024-08-03 PROCEDURE — 83735 ASSAY OF MAGNESIUM: CPT

## 2024-08-03 PROCEDURE — 2000000000 HC ICU R&B

## 2024-08-03 PROCEDURE — 96374 THER/PROPH/DIAG INJ IV PUSH: CPT

## 2024-08-03 PROCEDURE — 6360000002 HC RX W HCPCS

## 2024-08-03 PROCEDURE — 85652 RBC SED RATE AUTOMATED: CPT

## 2024-08-03 PROCEDURE — G0480 DRUG TEST DEF 1-7 CLASSES: HCPCS

## 2024-08-03 PROCEDURE — 93005 ELECTROCARDIOGRAM TRACING: CPT

## 2024-08-03 PROCEDURE — 85025 COMPLETE CBC W/AUTO DIFF WBC: CPT

## 2024-08-03 PROCEDURE — 81001 URINALYSIS AUTO W/SCOPE: CPT

## 2024-08-03 PROCEDURE — 71045 X-RAY EXAM CHEST 1 VIEW: CPT

## 2024-08-03 PROCEDURE — 99285 EMERGENCY DEPT VISIT HI MDM: CPT

## 2024-08-03 PROCEDURE — 84484 ASSAY OF TROPONIN QUANT: CPT

## 2024-08-03 PROCEDURE — 80053 COMPREHEN METABOLIC PANEL: CPT

## 2024-08-03 PROCEDURE — 99222 1ST HOSP IP/OBS MODERATE 55: CPT

## 2024-08-03 PROCEDURE — 80307 DRUG TEST PRSMV CHEM ANLYZR: CPT

## 2024-08-03 RX ORDER — POTASSIUM CHLORIDE 7.45 MG/ML
10 INJECTION INTRAVENOUS PRN
Status: DISCONTINUED | OUTPATIENT
Start: 2024-08-03 | End: 2024-08-04 | Stop reason: HOSPADM

## 2024-08-03 RX ORDER — SODIUM CHLORIDE 0.9 % (FLUSH) 0.9 %
5-40 SYRINGE (ML) INJECTION EVERY 12 HOURS SCHEDULED
Status: DISCONTINUED | OUTPATIENT
Start: 2024-08-03 | End: 2024-08-04 | Stop reason: HOSPADM

## 2024-08-03 RX ORDER — POTASSIUM CHLORIDE 20 MEQ/1
40 TABLET, EXTENDED RELEASE ORAL PRN
Status: DISCONTINUED | OUTPATIENT
Start: 2024-08-03 | End: 2024-08-04 | Stop reason: HOSPADM

## 2024-08-03 RX ORDER — LEVETIRACETAM 500 MG/5ML
500 INJECTION, SOLUTION, CONCENTRATE INTRAVENOUS EVERY 12 HOURS
Status: DISCONTINUED | OUTPATIENT
Start: 2024-08-04 | End: 2024-08-04 | Stop reason: HOSPADM

## 2024-08-03 RX ORDER — LEVETIRACETAM 500 MG/5ML
1000 INJECTION, SOLUTION, CONCENTRATE INTRAVENOUS ONCE
Status: COMPLETED | OUTPATIENT
Start: 2024-08-03 | End: 2024-08-03

## 2024-08-03 RX ORDER — POLYETHYLENE GLYCOL 3350 17 G/17G
17 POWDER, FOR SOLUTION ORAL DAILY PRN
Status: DISCONTINUED | OUTPATIENT
Start: 2024-08-03 | End: 2024-08-04 | Stop reason: HOSPADM

## 2024-08-03 RX ORDER — SODIUM CHLORIDE 9 MG/ML
INJECTION, SOLUTION INTRAVENOUS PRN
Status: DISCONTINUED | OUTPATIENT
Start: 2024-08-03 | End: 2024-08-04 | Stop reason: HOSPADM

## 2024-08-03 RX ORDER — SODIUM CHLORIDE 0.9 % (FLUSH) 0.9 %
5-40 SYRINGE (ML) INJECTION PRN
Status: DISCONTINUED | OUTPATIENT
Start: 2024-08-03 | End: 2024-08-04 | Stop reason: HOSPADM

## 2024-08-03 RX ORDER — LORAZEPAM 2 MG/ML
1 INJECTION INTRAMUSCULAR EVERY 5 MIN PRN
Status: DISCONTINUED | OUTPATIENT
Start: 2024-08-03 | End: 2024-08-04 | Stop reason: HOSPADM

## 2024-08-03 RX ORDER — SODIUM CHLORIDE 9 MG/ML
INJECTION, SOLUTION INTRAVENOUS CONTINUOUS
Status: DISCONTINUED | OUTPATIENT
Start: 2024-08-03 | End: 2024-08-04

## 2024-08-03 RX ORDER — ONDANSETRON 4 MG/1
4 TABLET, ORALLY DISINTEGRATING ORAL EVERY 8 HOURS PRN
Status: DISCONTINUED | OUTPATIENT
Start: 2024-08-03 | End: 2024-08-04 | Stop reason: HOSPADM

## 2024-08-03 RX ORDER — LORAZEPAM 2 MG/ML
INJECTION INTRAMUSCULAR
Status: COMPLETED
Start: 2024-08-03 | End: 2024-08-03

## 2024-08-03 RX ORDER — ACETAMINOPHEN 325 MG/1
650 TABLET ORAL EVERY 6 HOURS PRN
Status: DISCONTINUED | OUTPATIENT
Start: 2024-08-03 | End: 2024-08-04 | Stop reason: HOSPADM

## 2024-08-03 RX ORDER — 0.9 % SODIUM CHLORIDE 0.9 %
1000 INTRAVENOUS SOLUTION INTRAVENOUS ONCE
Status: COMPLETED | OUTPATIENT
Start: 2024-08-03 | End: 2024-08-03

## 2024-08-03 RX ORDER — ONDANSETRON 2 MG/ML
4 INJECTION INTRAMUSCULAR; INTRAVENOUS EVERY 6 HOURS PRN
Status: DISCONTINUED | OUTPATIENT
Start: 2024-08-03 | End: 2024-08-04 | Stop reason: HOSPADM

## 2024-08-03 RX ORDER — ACETAMINOPHEN 650 MG/1
650 SUPPOSITORY RECTAL EVERY 6 HOURS PRN
Status: DISCONTINUED | OUTPATIENT
Start: 2024-08-03 | End: 2024-08-04 | Stop reason: HOSPADM

## 2024-08-03 RX ORDER — LORAZEPAM 2 MG/ML
2 INJECTION INTRAMUSCULAR ONCE
Status: COMPLETED | OUTPATIENT
Start: 2024-08-03 | End: 2024-08-03

## 2024-08-03 RX ORDER — LABETALOL HYDROCHLORIDE 5 MG/ML
20 INJECTION, SOLUTION INTRAVENOUS ONCE
Status: COMPLETED | OUTPATIENT
Start: 2024-08-04 | End: 2024-08-03

## 2024-08-03 RX ORDER — ENOXAPARIN SODIUM 100 MG/ML
30 INJECTION SUBCUTANEOUS 2 TIMES DAILY
Status: DISCONTINUED | OUTPATIENT
Start: 2024-08-03 | End: 2024-08-04 | Stop reason: HOSPADM

## 2024-08-03 RX ORDER — MAGNESIUM SULFATE IN WATER 40 MG/ML
2000 INJECTION, SOLUTION INTRAVENOUS PRN
Status: DISCONTINUED | OUTPATIENT
Start: 2024-08-03 | End: 2024-08-04 | Stop reason: HOSPADM

## 2024-08-03 RX ADMIN — LEVETIRACETAM 1000 MG: 100 INJECTION INTRAVENOUS at 19:56

## 2024-08-03 RX ADMIN — LORAZEPAM 2 MG: 2 INJECTION INTRAMUSCULAR; INTRAVENOUS at 19:13

## 2024-08-03 RX ADMIN — LABETALOL HYDROCHLORIDE 20 MG: 5 INJECTION, SOLUTION INTRAVENOUS at 23:58

## 2024-08-03 RX ADMIN — SODIUM CHLORIDE, PRESERVATIVE FREE 10 ML: 5 INJECTION INTRAVENOUS at 23:58

## 2024-08-03 RX ADMIN — LORAZEPAM 2 MG: 2 INJECTION INTRAMUSCULAR at 19:13

## 2024-08-03 RX ADMIN — SODIUM CHLORIDE 1000 ML: 9 INJECTION, SOLUTION INTRAVENOUS at 18:36

## 2024-08-03 RX ADMIN — SODIUM CHLORIDE: 9 INJECTION, SOLUTION INTRAVENOUS at 23:23

## 2024-08-03 ASSESSMENT — PAIN - FUNCTIONAL ASSESSMENT: PAIN_FUNCTIONAL_ASSESSMENT: NONE - DENIES PAIN

## 2024-08-03 ASSESSMENT — LIFESTYLE VARIABLES
HOW MANY STANDARD DRINKS CONTAINING ALCOHOL DO YOU HAVE ON A TYPICAL DAY: PATIENT DOES NOT DRINK
HOW OFTEN DO YOU HAVE A DRINK CONTAINING ALCOHOL: NEVER

## 2024-08-03 NOTE — ED TRIAGE NOTES
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 Witnesses state seizure lasted about 45 seconds and mostly involved tremors of his extremities. Not on seizure medication only BP and anxiety- Pt currently A/OX4 states last seizure was one month ago.    C= \"Have you ever felt that you should Cut down on your drinking?\"  No  A= \"Have people Annoyed you by criticizing your drinking?\"  No  G= \"Have you ever felt bad or Guilty about your drinking?\"  No  E= \"Have you ever had a drink as an Eye-opener first thing in the morning to steady your nerves or to help a hangover?\"  No      Deferred []      Reason for deferring: N/A    *If yes to two or more: probable alcohol abuse.*

## 2024-08-03 NOTE — ED PROVIDER NOTES
(minutes):  38    Critical care time was exclusive of:  Separately billable procedures and treating other patients    Critical care was necessary to treat or prevent imminent or life-threatening deterioration of the following conditions:  CNS failure or compromise    Critical care was time spent personally by me on the following activities:  Blood draw for specimens, development of treatment plan with patient or surrogate, discussions with consultants, evaluation of patient's response to treatment, examination of patient, interpretation of cardiac output measurements, obtaining history from patient or surrogate, vascular access procedures, review of old charts, pulse oximetry, re-evaluation of patient's condition, ordering and review of radiographic studies, ordering and review of laboratory studies and ordering and performing treatments and interventions    I assumed direction of critical care for this patient from another provider in my specialty: no      Care discussed with: admitting provider        CONSULTS:  IP CONSULT TO NEUROLOGY  IP CONSULT TO HOSPITALIST  IP CONSULT TO NEUROLOGY      FINAL IMPRESSION      1. Seizures (HCC)          DISPOSITION / PLAN     DISPOSITION Decision To Admit 08/03/2024 08:11:21 PM      PATIENT REFERRED TO:  No follow-up provider specified.    DISCHARGE MEDICATIONS:  New Prescriptions    No medications on file       Ramo Bills DO  Emergency Medicine Physician     (Please note that portions of thisnote were completed with a voice recognition program.  Efforts were made to edit the dictations but occasionally words are mis-transcribed.)        Ramo Bills DO  08/03/24 2017

## 2024-08-04 VITALS
HEART RATE: 85 BPM | SYSTOLIC BLOOD PRESSURE: 142 MMHG | TEMPERATURE: 98 F | WEIGHT: 249.56 LBS | DIASTOLIC BLOOD PRESSURE: 96 MMHG | RESPIRATION RATE: 22 BRPM | OXYGEN SATURATION: 97 % | BODY MASS INDEX: 40.11 KG/M2 | HEIGHT: 66 IN

## 2024-08-04 PROBLEM — E66.01 SEVERE OBESITY (BMI >= 40) (HCC): Status: ACTIVE | Noted: 2024-08-04

## 2024-08-04 LAB
ALBUMIN SERPL-MCNC: 3.7 G/DL (ref 3.5–5.2)
ALP SERPL-CCNC: 93 U/L (ref 40–129)
ALT SERPL-CCNC: 12 U/L (ref 5–41)
ANION GAP SERPL CALCULATED.3IONS-SCNC: 15 MMOL/L (ref 9–17)
AST SERPL-CCNC: 14 U/L
BASOPHILS # BLD: 0.19 K/UL (ref 0–0.2)
BASOPHILS NFR BLD: 1 % (ref 0–2)
BILIRUB DIRECT SERPL-MCNC: <0.1 MG/DL
BILIRUB INDIRECT SERPL-MCNC: ABNORMAL MG/DL (ref 0–1)
BILIRUB SERPL-MCNC: 0.2 MG/DL (ref 0.3–1.2)
BUN SERPL-MCNC: 8 MG/DL (ref 6–20)
BUN/CREAT SERPL: 13 (ref 9–20)
CALCIUM SERPL-MCNC: 8.4 MG/DL (ref 8.6–10.4)
CHLORIDE SERPL-SCNC: 106 MMOL/L (ref 98–107)
CO2 SERPL-SCNC: 19 MMOL/L (ref 20–31)
CREAT SERPL-MCNC: 0.6 MG/DL (ref 0.7–1.2)
EOSINOPHIL # BLD: 0 K/UL (ref 0–0.44)
EOSINOPHILS RELATIVE PERCENT: 0 % (ref 1–4)
ERYTHROCYTE [DISTWIDTH] IN BLOOD BY AUTOMATED COUNT: 13 % (ref 11.8–14.4)
GFR, ESTIMATED: >90 ML/MIN/1.73M2
GLUCOSE BLD-MCNC: 103 MG/DL (ref 75–110)
GLUCOSE BLD-MCNC: 96 MG/DL (ref 75–110)
GLUCOSE SERPL-MCNC: 84 MG/DL (ref 70–99)
HCT VFR BLD AUTO: 43.7 % (ref 40.7–50.3)
HGB BLD-MCNC: 14.2 G/DL (ref 13–17)
IMM GRANULOCYTES # BLD AUTO: 0 K/UL (ref 0–0.3)
IMM GRANULOCYTES NFR BLD: 0 %
LACTATE BLDV-SCNC: 1 MMOL/L (ref 0.5–2.2)
LACTATE BLDV-SCNC: 2.1 MMOL/L (ref 0.5–1.9)
LYMPHOCYTES NFR BLD: 2.62 K/UL (ref 1.1–3.7)
LYMPHOCYTES RELATIVE PERCENT: 14 % (ref 24–43)
MAGNESIUM SERPL-MCNC: 2.4 MG/DL (ref 1.6–2.6)
MCH RBC QN AUTO: 28.3 PG (ref 25.2–33.5)
MCHC RBC AUTO-ENTMCNC: 32.5 G/DL (ref 28.4–34.8)
MCV RBC AUTO: 87.1 FL (ref 82.6–102.9)
MONOCYTES NFR BLD: 1.5 K/UL (ref 0.1–1.2)
MONOCYTES NFR BLD: 8 % (ref 3–12)
NEUTROPHILS NFR BLD: 77 % (ref 36–65)
NEUTS SEG NFR BLD: 14.39 K/UL (ref 1.5–8.1)
NRBC BLD-RTO: 0 PER 100 WBC
PLATELET # BLD AUTO: 382 K/UL (ref 138–453)
PMV BLD AUTO: 8.9 FL (ref 8.1–13.5)
POTASSIUM SERPL-SCNC: 3.4 MMOL/L (ref 3.7–5.3)
PROLACTIN SERPL-MCNC: 31 NG/ML (ref 4.04–15.2)
PROT SERPL-MCNC: 6.9 G/DL (ref 6.4–8.3)
RBC # BLD AUTO: 5.02 M/UL (ref 4.21–5.77)
SODIUM SERPL-SCNC: 140 MMOL/L (ref 135–144)
WBC OTHER # BLD: 18.7 K/UL (ref 3.5–11.3)

## 2024-08-04 PROCEDURE — 83605 ASSAY OF LACTIC ACID: CPT

## 2024-08-04 PROCEDURE — 99254 IP/OBS CNSLTJ NEW/EST MOD 60: CPT | Performed by: PSYCHIATRY & NEUROLOGY

## 2024-08-04 PROCEDURE — 94761 N-INVAS EAR/PLS OXIMETRY MLT: CPT

## 2024-08-04 PROCEDURE — 6370000000 HC RX 637 (ALT 250 FOR IP)

## 2024-08-04 PROCEDURE — 83735 ASSAY OF MAGNESIUM: CPT

## 2024-08-04 PROCEDURE — 36415 COLL VENOUS BLD VENIPUNCTURE: CPT

## 2024-08-04 PROCEDURE — 80048 BASIC METABOLIC PNL TOTAL CA: CPT

## 2024-08-04 PROCEDURE — 99232 SBSQ HOSP IP/OBS MODERATE 35: CPT | Performed by: INTERNAL MEDICINE

## 2024-08-04 PROCEDURE — 85025 COMPLETE CBC W/AUTO DIFF WBC: CPT

## 2024-08-04 PROCEDURE — 6360000002 HC RX W HCPCS

## 2024-08-04 PROCEDURE — 2580000003 HC RX 258

## 2024-08-04 PROCEDURE — 82947 ASSAY GLUCOSE BLOOD QUANT: CPT

## 2024-08-04 PROCEDURE — 6370000000 HC RX 637 (ALT 250 FOR IP): Performed by: NURSE PRACTITIONER

## 2024-08-04 PROCEDURE — 80076 HEPATIC FUNCTION PANEL: CPT

## 2024-08-04 RX ORDER — LEVETIRACETAM 500 MG/1
500 TABLET ORAL 2 TIMES DAILY
Qty: 60 TABLET | Refills: 3 | Status: SHIPPED | OUTPATIENT
Start: 2024-08-04

## 2024-08-04 RX ORDER — AMLODIPINE BESYLATE 5 MG/1
5 TABLET ORAL DAILY
Status: DISCONTINUED | OUTPATIENT
Start: 2024-08-04 | End: 2024-08-04 | Stop reason: HOSPADM

## 2024-08-04 RX ADMIN — SODIUM CHLORIDE, PRESERVATIVE FREE 10 ML: 5 INJECTION INTRAVENOUS at 09:20

## 2024-08-04 RX ADMIN — ENOXAPARIN SODIUM 30 MG: 100 INJECTION SUBCUTANEOUS at 09:20

## 2024-08-04 RX ADMIN — LEVETIRACETAM 500 MG: 100 INJECTION INTRAVENOUS at 05:49

## 2024-08-04 RX ADMIN — POTASSIUM CHLORIDE 40 MEQ: 1500 TABLET, EXTENDED RELEASE ORAL at 06:41

## 2024-08-04 RX ADMIN — AMLODIPINE BESYLATE 5 MG: 5 TABLET ORAL at 06:15

## 2024-08-04 ASSESSMENT — PAIN SCALES - GENERAL: PAINLEVEL_OUTOF10: 0

## 2024-08-04 NOTE — PROGRESS NOTES
Writer spoke with pts step-mom, Emilee, and gave her an update on pts night/condition. All questions and concerns addressed.

## 2024-08-04 NOTE — PLAN OF CARE
Problem: Discharge Planning  Goal: Discharge to home or other facility with appropriate resources  8/4/2024 1501 by Adela Cronin RN  Outcome: Adequate for Discharge  8/4/2024 1457 by Adela Cronin RN  Outcome: Adequate for Discharge  Flowsheets (Taken 8/4/2024 0845)  Discharge to home or other facility with appropriate resources: Identify barriers to discharge with patient and caregiver     Problem: Skin/Tissue Integrity  Goal: Absence of new skin breakdown  Description: 1.  Monitor for areas of redness and/or skin breakdown  2.  Assess vascular access sites hourly  3.  Every 4-6 hours minimum:  Change oxygen saturation probe site  4.  Every 4-6 hours:  If on nasal continuous positive airway pressure, respiratory therapy assess nares and determine need for appliance change or resting period.  8/4/2024 1501 by Adela Cronin RN  Outcome: Adequate for Discharge  8/4/2024 1457 by Adela Cronin RN  Outcome: Adequate for Discharge     Problem: Safety - Adult  Goal: Free from fall injury  8/4/2024 1501 by Adela Cronin RN  Outcome: Adequate for Discharge  8/4/2024 1457 by Adela Cronin RN  Outcome: Adequate for Discharge     Problem: Pain  Goal: Verbalizes/displays adequate comfort level or baseline comfort level  8/4/2024 1501 by Adela Cronin RN  Outcome: Adequate for Discharge  8/4/2024 1457 by Adela Cronin RN  Outcome: Adequate for Discharge  Flowsheets (Taken 8/4/2024 0845)  Verbalizes/displays adequate comfort level or baseline comfort level: Assess pain using appropriate pain scale

## 2024-08-04 NOTE — ED NOTES
Report given to Delma MORALES, RN from Irena MORALES.   Report method by phone   The following was reviewed with receiving RN:   Current vital signs:  BP (!) 158/98   Pulse 95   Temp 98.1 °F (36.7 °C) (Oral)   Resp (!) 32   Ht 1.676 m (5' 6\")   Wt 113.4 kg (250 lb)   SpO2 95%   BMI 40.35 kg/m²                MEWS Score: 4     Any medication or safety alerts were reviewed. Any pending diagnostics and notifications were also reviewed, as well as any safety concerns or issues, abnormal labs, abnormal imaging, and abnormal assessment findings. Questions were answered.

## 2024-08-04 NOTE — DISCHARGE SUMMARY
Seizures    This is a 29-year-old male with witnessed seizure-like activity while at work.  Admission at [facility] 1 month ago for new onset seizure-like activity underwent extensive investigation with neurology including MRI, CT scan as well as LTME monitoring.  There is no evidence of seizure activity and after evaluation he was taken off antiepileptic medications after initial loading and discharged home.  He returns to [place] at this time after witnessed seizure activity at work.  He had felt anxious and lightheaded and ultimately had tonic-clonic activity and bit his tongue.  He was brought here to the emergency room was loaded with Keppra and admitted for further evaluation.  He was placed on seizure precautions.  Underwent neurology evaluation as he has had extensive recent workup he was placed on Keppra 500 mg twice daily open follow-up with neurology as an outpatient.  No further need for aggressive workup at this time.      Significant therapeutic interventions: As above    Significant Diagnostic Studies:   Labs / Micro:  CBC:   Lab Results   Component Value Date/Time    WBC 18.7 08/04/2024 03:13 AM    RBC 5.02 08/04/2024 03:13 AM    HGB 14.2 08/04/2024 03:13 AM    HCT 43.7 08/04/2024 03:13 AM    MCV 87.1 08/04/2024 03:13 AM    MCH 28.3 08/04/2024 03:13 AM    MCHC 32.5 08/04/2024 03:13 AM    RDW 13.0 08/04/2024 03:13 AM     08/04/2024 03:13 AM     BMP:    Lab Results   Component Value Date/Time    GLUCOSE 84 08/04/2024 03:13 AM     08/04/2024 03:13 AM    K 3.4 08/04/2024 03:13 AM     08/04/2024 03:13 AM    CO2 19 08/04/2024 03:13 AM    ANIONGAP 15 08/04/2024 03:13 AM    BUN 8 08/04/2024 03:13 AM    CREATININE 0.6 08/04/2024 03:13 AM    CALCIUM 8.4 08/04/2024 03:13 AM    LABGLOM >90 08/04/2024 03:13 AM    GFRAA >60 11/27/2014 10:38 PM    GFR      11/27/2014 10:38 PM    GFR NOT REPORTED 11/27/2014 10:38 PM        Radiology:  XR CHEST PORTABLE    Result Date:

## 2024-08-04 NOTE — ED NOTES
Pt awakened aggressive behavior, attempted to reorient pulled out IV and trying to climb out of bed. States he has to urinate. Helped pt up to side of bed, urinated, specimen obtained and sent to lab. Returned to bed, step mother at bedside- updated per Dr Bills. Seizure precautions remain in place.

## 2024-08-04 NOTE — ED NOTES
Pt put call light on states he needed his nurse, within moments heard yell and alarms going off in pts room. Pt found to be having a tonic clonic seizure. Pt bit his tongue, drooling, turned to left side. Called for help. Dr Bills at bedside, orders received, administered 2mg Ativan IVP. Pt diaphoretic, maintaining airway.

## 2024-08-04 NOTE — PROGRESS NOTES
SPIRITUAL CARE DEPARTMENT [facility]  PROGRESS NOTE    Room # 1101/1101-01   Name: Kane Mijares            Jew: Christianity     Reason for visit:  Social Isolation    I visited the patient.    Admit Date & Time: 8/3/2024  5:58 PM    Assessment:  Kane Mijares is a 29 y.o. male in the hospital because Seizures. Upon entering the room Pt was alert and responsive.  Writer and Pt. 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      Intervention:  I introduced myself and my title as  I offered space for Pt.  to express feelings, needs, and concerns and provided a ministry presence. Writer dropped off Prayer Card and engaged in Prayer with Pt. For health and well-being.    Outcome:  Pt. Expressed appreciation for visit.    Plan:  Chaplains will remain available to offer spiritual and emotional support as needed.    Electronically signed by Chaplain Louis, on 8/4/2024 at 11:51 AM.  Spiritual Care Department  [facility]       08/04/24 1147   Encounter Summary   Encounter Overview/Reason Initial Encounter;Spiritual/Emotional Needs;Loneliness/Social Isolation   Service Provided For Patient   Referral/Consult From Other (comment)  (Social Isolation)   Support System Unknown   Last Encounter  08/04/24   Complexity of Encounter Low   Begin Time 1130   End Time  1145   Total Time Calculated 15 min   Spiritual/Emotional needs   Type Spiritual Support   Assessment/Intervention/Outcome   Assessment Calm;Hopeful;Peaceful;Coping   Intervention Active listening;Prayer (assurance of)/[place];Sustaining Presence/Ministry of presence   Outcome Expressed Gratitude;Engaged in conversation   Plan and Referrals   Plan/Referrals Continue Support (comment)

## 2024-08-04 NOTE — H&P
[facility]  Office: [phone]  Jose Gupta DO, Guille Ruiz DO, Jorge De Souza DO, Randell Arriaga DO, Jean Paul Murillo MD, Linda Rivers MD, Susan Parra MD, Krystin Tubbs MD,  Carlo Salter MD, Marcus Lewis MD, Bryant Olmstead MD,  Uvaldo Massey DO, Juanita Adrian MD, Caden Dwyer MD, Dao Gupta DO, Lizeth Payton MD,  Edy Maharaj DO, Crystal Dozier MD, Alice Miller MD, Caryn Helm MD, Howard Wolfe MD,  Bird Bridges MD, Ruthy Bryson MD, Isabelle Ornelas MD, Jordi Lan MD, Yehuda Walker MD, Fabricio Gomez MD, Ezio Nicole DO, Star Barajas DO, Ahsan Calle MD,  Josue Romero MD, Shirley Waterhouse, CNP,  Gudelia Nick, CNP, Toby Dempsey, CNP,  Trudy Hale, DNP, Maryanne Mccoy, CNP, Shobha Lynch, CNP, Ariane Kaiser CNP, Dominique Franklin, CNP, Nina Landin, CNP, Nano Neff, PA-C, Genesis Sigala, PA-C, Olena Craven, CNP, Jeanie Castillo, CNP, Eliana Ames, CNP, Airam Velázquez, CNS, Antonella Bah, CNP, Cheyenne Monson, CNP, Tracy Schwab, CNP         [facility]   IN-PATIENT SERVICE   [facility]    HISTORY AND PHYSICAL EXAMINATION            Date:   8/3/2024  Patient name:  Kane Mijares  Date of admission:  8/3/2024  5:58 PM  MRN:   [id number]  Account:  [id number]  YOB: 1995  PCP:    No primary care provider on file.  Room:   [place] [address]  Code Status:    Full Code    Chief Complaint:     Chief Complaint   Patient presents with    Seizures       History Obtained From:     patient, mother    History of Present Illness:     Kane Mijares is a 29 y.o.  /  male who presents with Seizures   and is admitted to the hospital for the management of Seizure (HCC). Patient was recently discharged from [facility] on 7/6/2024 where he presented with seizure-like activity and unresponsiveness with tonic-clonic rigidity in the emergency department requiring intubation. He was then initiated on LTME, CT

## 2024-08-04 NOTE — PROGRESS NOTES
Pt discharged to home. [place] and monitor removed. Discharge instructions given and explained to pt. Pt verbalized understanding. Pt denies any questions or concerns. Pt assisted to front door with all belongings. Pt aware he needs to  the keppra from [facility] pharmacy today and start a dose tonight.

## 2024-08-04 NOTE — CONSULTS
[facility] Neurology   IN-PATIENT SERVICE      NEUROLOGY CONSULT  NOTE            Date:   8/4/2024  Patient name:  Kane Mijares  Date of admission:  8/3/2024  YOB: 1995      Chief Complaint:     Chief Complaint   Patient presents with    Seizures       Reason for Consult:          History of Present Illness:     The patient is a 29 y.o. male who presents with Seizures  . The patient was seen and examined and the chart was reviewed.     This is a 29 year old male who presented to the ED with seizure activity which lasted 45 seconds. Int he Ed he had another seizure which lasted about a minute- per ED staff had blood in his mouth afterwards and was post-ictal. Urinalysis returned negative, urine tox screen positive for marijuana. Negative ethanol level. He did receive IV Ativan and additionally Keppra 1 g IV.     Per discharge summary from 7/6/24:  The patient is a 30 yo male with history of anxiety disorder who presented to the ED with altered mental status concerning for possible overdose. Per noted, patient was found unresponsive, received narcan and transported via EMS to ER. While in the ED, patient had a 90 second tonic-clonic seizure like activity. He was given a total of 12mg Ativan due to agitation and then required intubation for airway protection. Patient admitted to Neuro ICU. Was extybated at 7/4.   Reports he has been depressed and anxious recently, reporting having typically 2 panic attacks per week. He does not have a PCP and has not seen anyone for this in the past. Does smoke cannabis twice daily, gets from a dispensary in [place]. LTME with no seizures . Lactic normalized, CT and MRI brain unremarkable.  Psychiatrist was consulted and an they recommended to start on buspirone 5 mg twice daily, with recommendation to consult to trauma recovery center for placement engagement psychotherapy.  To follow-up with psychiatry as outpatient  Due to hypertensive, patient was started on Norvasc 5  Verification of Patient Location:  The patient affirms they are currently located in the following state: [place]   Yes  Request for Consent:    Audio and Video Encounter   Saloni, my name is Kristie Jensen DO.  Before we proceed, can you please verify your identification by telling me your full name and date of birth?      Confirm    Can you tell me who is in the room with you?  No     You and I are about to have a telemedicine check-in or visit because you have requested it.  This is a live video-conference.  I am a real person, speaking to you in real time.  There is no one else with me on the video-conference.  However, when we use ([employer], [employer], etc) it is important for you to know that the video-conference may not be secure or private.  I am not recording this conversation and I am asking you not to record it.  This telemedicine visit will be billed to your health insurance or you, if you are self-insured.  You understand you will be responsible for any copayments or coinsurances that apply to your telemedicine visit.  Communication platform used for this encounter:  [employer] Video Visit (with Zoom integration)       Before starting our telemedicine visit, I am required to get your consent for this virtual check-in or visit by telemedicine. Do you consent?     Patient Response to Request for Consent:  Yes     Time Spent  I spent 45 minutes on this date of service performing the following activities: providing counseling and education.    Update:   Started GOLO diet, they give her a specific shopping list. 1st week is a reset, no dairy, flour or gluten, just protein and unrefined carbs. Turner wants her to follow their specific plan for 3 months and hasn't lost weight. [employer] has online support that she can call when she needs help. Started on one of the GOLO supplements, 1 capsule with each meal. Yesterday 253 lbs.    Walking a little, rode her bike on Sunday.     GAVIN, hereditary hemochromatosis, has  f/u with [facility] [place] in January. LFTs within range since 6/2021.     New onset joint pain in fingers b/l. Started taking curcumin.    Hot flashes, tried Estrovera, not working. Hot flashes at night.     Hyperglycemia, A1c 6.4 7/2022.     HL, lipids checked in July. Cholest-off started.     Hypothyroidism, TSH 0.359 9/14/22, Levothyroxine decreased to 75 mcg. Taking Cytomel 1/2 tablet.     Fatigue towards the end of the day.     GERD, EGD 8/11/22 mild gastritis. Restarted Pantoprazole x 3 weeks. As long as she doesn't eat close to bedtime she doesn't have symptoms.    Colonoscopy 8/11/22, 2 sessile polyps, diverticulosis. Repeat 3 years.      AARON, spoke to dentist about oral appliance. Waiting for a call back from dentist in [facility].     Bowel movements ok, after colonoscopy was having loose stools for 3 weeks, now regular, daily. Taking psyllium every 2-3 days.     Supplements: [cinnamon, berberine bid, fish oil, choleast, astragalus, vit d, elderberry, konsyl psyllium, terra health acid relief, dhea 2x/week, Nature's Made Cholest-off 2 bid with meals, tumeric, GOLO Release, milk thistle ]    Going to [place] to [facility] with son she gave up for adoption 51 years ago.     PHX:     Went to [facility] [place] to GI and Hepatology department, Dr ROMAINE wanted her to lose 10% of body weight in 3 months. [facility], doing smoothies with 310 organic protein powder with water, some fruit, cottage cheese, fruit, celery sticks, pb, carrots, scallops, green beans. Gained 6 lbs. Less than 1200 calories per day. Cheese and gf crackers. Has follow up tomorrow. Logging food on kevin, Lose It. Has f/u with Hepatologist in August. 2-3 meals/day. Recommend weighing herself each week.      + fatigue.      Vertigo in February, went to hospital, resolved in 48 hrs. Saw ENT.      Labs: A1c 6.4, DHEA 246, LDL-P 1,222, TSH 0.296     GERD, off of Pantoprazole and has rare reflux symptoms after lying down after eating. Tries not to eat after 4  or 4:30 in the afternoon.      AST, ALT elevated, Hepatologist wanted her to stop tumeric.      Supplements: [cinnamon, berberine bid, fish oil, choleast, astragalus, vit d, elderberry, konsyl psyllium, terra health acid relief, dhea qod ]     Not sleeping well. Very depressed. Waking up every 1.5 hrs to urinate.      AARON, doesn't want to use CPAP. Open to using a mouth guard.      Cologuard test done last week, doesn't have results yet.      Will hold off on endoscopy.     A/P:  GAVIN: Continue to work on weight reduction. Continue routine exercise, GOLO food plan.   Hyperglycemia: Discussed CGM. Rx sent to pharmacy. Recheck A1c in January.  HL: Continue Cholest-off, Choleast, recheck lipids in January.  Hypothyroidism: Continue Levothyroxine, Cytomel, recheck TFTs in 3-4 weeks.  Post-menopausal syndrome:Can start Black Cohosh (Remifemin 20 mg 1 - 2 bid) or Promensil Forte Red Grundy 80 mg , One a day.    Insomnia: Melatonin-SR 2-4 mg qhs.

## 2024-08-04 NOTE — PROGRESS NOTES
End Of Shift Note  [facility]  Summary of shift: Pt admitted to ICU, placed in seizure precautions, A/Ox4. NS@75mL/hr per order. BP elevated on admit: 20mg labetalol given IV d/t NPO status. (Ok for ice chips and popsicles) BP still elevated at 0500: 5mg Norvasc (home dose) started daily per NP and given. Pt remains cooperative. No seizure activity noted since admission. 650mL u/o measured in urinal, no BM. Pt tachypnic at times 30-40. K 3.4 this AM- 40meq oral given. Lactic 2.1 this AM- NP notified- redraw scheduled for 0800.    Vitals:    Vitals:    08/04/24 0306 08/04/24 0400 08/04/24 0500 08/04/24 0555   BP: 90/77 (!) 136/92 (!) 151/134 (!) 142/100   Pulse: 99 87 84 84   Resp: 25 19 (!) 35 26   Temp:  97.3 °F (36.3 °C)     TempSrc:  Temporal     SpO2:  98% 94%    Weight:       Height:            I&O:   Intake/Output Summary (Last 24 hours) at 8/4/2024 0614  Last data filed at 8/4/2024 0450  Gross per 24 hour   Intake 681.19 ml   Output 650 ml   Net 31.19 ml       Resp Status: RA    Ventilator Settings:     / / /     Critical Care IV infusions:   sodium chloride      sodium chloride 75 mL/hr at 08/04/24 0044        LDA:   Peripheral IV 08/03/24 Posterior;Right Hand (Active)   Number of days: 0       Peripheral IV 08/03/24 Right;Anterior Forearm (Active)   Number of days: 0       Wound 11/28/14 Laceration Elbow Left (Active)   Number of days: 3537

## 2024-08-04 NOTE — CARE COORDINATION
Case Management Assessment  Initial Evaluation    Date/Time of Evaluation: 8/4/2024 10:37 AM  Assessment Completed by: Irena Warner RN    If patient is discharged prior to next notation, then this note serves as note for discharge by case management.    Patient Name: Kane Mijares                   YOB: 1995  Diagnosis: Seizure (HCC) [R56.9]  Seizures (HCC) [R56.9]                   Date / Time: 8/3/2024  5:58 PM    Patient Admission Status: Inpatient   Readmission Risk (Low < 19, Mod (19-27), High > 27): Readmission Risk Score: 12.1    Current PCP: No primary care provider on file.  PCP verified by CM? No (No PCP  Provided PCP list)    Chart Reviewed: Yes      History Provided by: Patient  Patient Orientation: Alert and Oriented    Patient Cognition: Alert    Hospitalization in the last 30 days (Readmission):  Yes    If yes, Readmission Assessment in CM Navigator will be completed.  Readmission Assessment  Number of Days since last admission?: 8-30 days  Previous Disposition: Home with Family  Who is being Interviewed: Patient  What was the patient's/caregiver's perception as to why they think they needed to return back to the hospital?: Other (Comment) (\" Had a seizure, squad brought me in\")  Did you visit your Primary Care Physician after you left the hospital, before you returned this time?: No  Why weren't you able to visit your PCP?: Other (Comment) (no PCP)  Did you see a specialist, such as Cardiac, Pulmonary, Orthopedic Physician, etc. after you left the hospital?: No  Who advised the patient to return to the hospital?: Self-referral  Does the patient report anything that got in the way of taking their medications?: No  In our efforts to provide the best possible care to you and others like you, can you think of anything that we could have done to help you after you left the hospital the first time, so that you might not have needed to return so soon?: Other (Comment) (\"I don't really

## 2024-08-04 NOTE — PROGRESS NOTES
Pt transferred to ICU from ED with Irena MORALES via stretcher. Pt lethargic but awoke and A/Ox4 when arrived to RM 1101. Pt cleaned up d/t blood on arm from prev IV. New 20G IV placed in LFA and NS at 75mL/hr started per order. Pt tongue has visual bite marks on tip but patient continues to request water/popsicles. Writer instructed pt and step mom at bedside that diet is NPO and strict bedrest is ordered but I will reach out to advocate. Writer also educated step mom, Emilee, and pt about procedures ordered/scheduled. Pt voiced annoyance with hospital stay stating, \"I am about to be here for 4 days, this is some bullshit.\" Pt remains calm and cooperative at time of note. Call light within reach, bed alarm on, fall precautions posted.

## 2024-08-04 NOTE — PROGRESS NOTES
[facility]  Office: [phone]  Jose Gupta DO, Guille Ruiz DO, Jorge De Souza DO, Randell Arriaga DO, Jean Paul Murillo MD, Linda Rivers MD, Susan Parra MD, Krystin Tubbs MD,  Carlo Salter MD, Marcus Lewis MD, Bryant Olmstead MD,  Uvaldo Massey DO, Juanita Adrian MD, Caden Dwyer MD, Dao Gupta DO, Lizeth Payton MD,  Edy Maharaj DO, Crystal Dozier MD, Alice Miller MD, Caryn Helm MD, Howard Wolfe MD,  Bird Bridges MD, Ruthy Bryson MD, Isabelle Ornelas MD, Jordi Lan MD, Yehuda Walker MD, Fabricio Gomez MD, Ezio Nicole DO, Star Barajas DO, Ahsan Calle MD,  Josue Romero MD, Shirley Waterhouse, CNP,  Gudelia Nick CNP, Toby Dempsey, CNP,  Trudy Hale, DNP, Maryanne Mccoy, CNP, Shobha Lynch, CNP, Dominique Franklin CNP, Nina Landin, CNP, Nano Neff, PA-C, Genesis Sigala PA-C, Olena Craven, CNP, Jeanie Castillo, CNP, Chelsey Pacheco, CNP, Eliana Ames, CNP, Ariane Kaiser, CNP, Airam Velázquez, CNS, Antonella Bah, CNP, Cheyenne Monson CNP, Tracy Schwab, CNP         [facility]   IN-PATIENT SERVICE   [facility]    Progress Note    8/4/2024    11:59 AM    Name:   Kane Mijares  MRN:     [id number]     Acct:      [id number]   Room:   [facility] Day:  1  Admit Date:  8/3/2024  5:58 PM    PCP:   No primary care provider on file.  Code Status:  Full Code    Subjective:     C/C:   Chief Complaint   Patient presents with    Seizures     Interval History Status: improved.     No further seizure activity.  Patient is resting in bed denies any complaints of chest pain, shortness of breath, nausea vomiting, fevers or chills.  Only complaint is tongue soreness after biting his tongue with his event yesterday.    Brief History:     This is a 29-year-old male that had a recent admission at [facility] with seizure-like activity underwent neurology evaluation with extensive evaluation of LTME no routine imaging.  Evaluation was

## 2024-08-04 NOTE — PLAN OF CARE
Pt has denied pain this shift, no new skin isssues identified. Plan is for pt to go home start keppra PO 500mg BID starting tonight and pt is aware he needs to pick it up from [facility] pharmacy today and then follow up with neurology in 3 weeks and above info relayed to Emilee pts parent.  Outcome: Adequate for Discharge  Flowsheets (Taken 8/4/2024 0845)  Discharge to home or other facility with appropriate resources: Identify barriers to discharge with patient and caregiver     Problem: Skin/Tissue Integrity  Goal: Absence of new skin breakdown  Description: 1.  Monitor for areas of redness and/or skin breakdown  2.  Assess vascular access sites hourly  3.  Every 4-6 hours minimum:  Change oxygen saturation probe site  4.  Every 4-6 hours:  If on nasal continuous positive airway pressure, respiratory therapy assess nares and determine need for appliance change or resting period.  Outcome: Adequate for Discharge     Problem: Safety - Adult  Goal: Free from fall injury  Outcome: Adequate for Discharge     Problem: Pain  Goal: Verbalizes/displays adequate comfort level or baseline comfort level  Outcome: Adequate for Discharge  Flowsheets (Taken 8/4/2024 0845)  Verbalizes/displays adequate comfort level or baseline comfort level: Assess pain using appropriate pain scale

## 2024-08-04 NOTE — PROGRESS NOTES
Pt resting quietly in bed, informed of dc order and pt states he doesn't have a ride at this time. He will need to wait till his stepmom is off work. Pt then picked up phone to make phone calls. Pt informed if he has a ride to let staff know and staff will review dc papers, remove IVs and monitor and then assist pt with dc. Informed pt there are no clothes here and asked if family is able to bring in clothes for pt to wear home.

## 2024-08-05 LAB
EKG ATRIAL RATE: 105 BPM
EKG P AXIS: 34 DEGREES
EKG P-R INTERVAL: 168 MS
EKG Q-T INTERVAL: 342 MS
EKG QRS DURATION: 106 MS
EKG QTC CALCULATION (BAZETT): 452 MS
EKG R AXIS: -4 DEGREES
EKG T AXIS: 12 DEGREES
EKG VENTRICULAR RATE: 105 BPM

## 2024-09-13 ENCOUNTER — HOSPITAL ENCOUNTER (INPATIENT)
Age: 29
LOS: 1 days | Discharge: HOME | DRG: 101 | End: 2024-09-14
Attending: EMERGENCY MEDICINE | Admitting: PSYCHIATRY & NEUROLOGY

## 2024-09-13 ENCOUNTER — APPOINTMENT (OUTPATIENT)
Dept: CT IMAGING | Age: 29
DRG: 101 | End: 2024-09-13

## 2024-09-13 DIAGNOSIS — G40.919 BREAKTHROUGH SEIZURE (HCC): Primary | ICD-10-CM

## 2024-09-13 PROBLEM — R40.4 SPELL OF ALTERED CONSCIOUSNESS: Status: ACTIVE | Noted: 2024-09-13

## 2024-09-13 LAB
AMORPH SED URNS QL MICRO: ABNORMAL
AMPHET UR QL SCN: NEGATIVE
ANION GAP SERPL CALCULATED.3IONS-SCNC: 8 MMOL/L (ref 9–16)
BACTERIA URNS QL MICRO: ABNORMAL
BACTERIA URNS QL MICRO: NORMAL
BARBITURATES UR QL SCN: NEGATIVE
BASOPHILS # BLD: 0.11 K/UL (ref 0–0.2)
BASOPHILS NFR BLD: 1 % (ref 0–2)
BENZODIAZ UR QL: NEGATIVE
BILIRUB UR QL STRIP: ABNORMAL
BILIRUB UR QL STRIP: NEGATIVE
BUN SERPL-MCNC: 13 MG/DL (ref 6–20)
CALCIUM SERPL-MCNC: 8.4 MG/DL (ref 8.6–10.4)
CANNABINOIDS UR QL SCN: POSITIVE
CASTS #/AREA URNS LPF: ABNORMAL /LPF (ref 0–2)
CASTS #/AREA URNS LPF: ABNORMAL /LPF (ref 0–2)
CASTS #/AREA URNS LPF: NORMAL /LPF (ref 0–8)
CHARACTER UR: ABNORMAL
CHLORIDE SERPL-SCNC: 107 MMOL/L (ref 98–107)
CK SERPL-CCNC: 69 U/L (ref 39–308)
CLARITY UR: ABNORMAL
CLARITY UR: CLEAR
CO2 SERPL-SCNC: 23 MMOL/L (ref 20–31)
COCAINE UR QL SCN: NEGATIVE
COLOR UR: ABNORMAL
COLOR UR: YELLOW
CREAT SERPL-MCNC: 0.7 MG/DL (ref 0.7–1.2)
EOSINOPHIL # BLD: 0.36 K/UL (ref 0–0.44)
EOSINOPHILS RELATIVE PERCENT: 2 % (ref 1–4)
EPI CELLS #/AREA URNS HPF: ABNORMAL /HPF
EPI CELLS #/AREA URNS HPF: NORMAL /HPF (ref 0–5)
ERYTHROCYTE [DISTWIDTH] IN BLOOD BY AUTOMATED COUNT: 13.5 % (ref 11.8–14.4)
ETHANOL PERCENT: <0.01 %
ETHANOLAMINE SERPL-MCNC: <10 MG/DL (ref 0–0.08)
FENTANYL UR QL: NEGATIVE
GFR, ESTIMATED: >90 ML/MIN/1.73M2
GLUCOSE BLD-MCNC: 113 MG/DL (ref 75–110)
GLUCOSE BLD-MCNC: 117 MG/DL (ref 75–110)
GLUCOSE SERPL-MCNC: 96 MG/DL (ref 74–99)
GLUCOSE UR STRIP-MCNC: ABNORMAL MG/DL
GLUCOSE UR STRIP-MCNC: NEGATIVE MG/DL
HCT VFR BLD AUTO: 51.3 % (ref 40.7–50.3)
HGB BLD-MCNC: 15.8 G/DL (ref 13–17)
HGB UR QL STRIP.AUTO: ABNORMAL
HGB UR QL STRIP.AUTO: NEGATIVE
IMM GRANULOCYTES # BLD AUTO: 0.13 K/UL (ref 0–0.3)
IMM GRANULOCYTES NFR BLD: 1 %
KETONES UR STRIP-MCNC: ABNORMAL MG/DL
KETONES UR STRIP-MCNC: NEGATIVE MG/DL
LEUKOCYTE ESTERASE UR QL STRIP: ABNORMAL
LEUKOCYTE ESTERASE UR QL STRIP: NEGATIVE
LYMPHOCYTES NFR BLD: 4.8 K/UL (ref 1.1–3.7)
LYMPHOCYTES RELATIVE PERCENT: 32 % (ref 24–43)
MCH RBC QN AUTO: 27.7 PG (ref 25.2–33.5)
MCHC RBC AUTO-ENTMCNC: 30.8 G/DL (ref 28.4–34.8)
MCV RBC AUTO: 89.8 FL (ref 82.6–102.9)
METHADONE UR QL: NEGATIVE
MONOCYTES NFR BLD: 1.04 K/UL (ref 0.1–1.2)
MONOCYTES NFR BLD: 7 % (ref 3–12)
MUCOUS THREADS URNS QL MICRO: ABNORMAL
NEUTROPHILS NFR BLD: 57 % (ref 36–65)
NEUTS SEG NFR BLD: 8.69 K/UL (ref 1.5–8.1)
NITRITE UR QL STRIP: ABNORMAL
NITRITE UR QL STRIP: NEGATIVE
NRBC BLD-RTO: 0 PER 100 WBC
OPIATES UR QL SCN: NEGATIVE
OXYCODONE UR QL SCN: NEGATIVE
PCP UR QL SCN: NEGATIVE
PH UR STRIP: 5.5 [PH] (ref 5–8)
PH UR STRIP: ABNORMAL [PH] (ref 5–8)
PLATELET # BLD AUTO: ABNORMAL K/UL (ref 138–453)
PLATELET, FLUORESCENCE: ABNORMAL K/UL (ref 138–453)
POTASSIUM SERPL-SCNC: 4.3 MMOL/L (ref 3.7–5.3)
PROLACTIN SERPL-MCNC: 16.8 NG/ML (ref 4.04–15.2)
PROT UR STRIP-MCNC: ABNORMAL MG/DL
PROT UR STRIP-MCNC: ABNORMAL MG/DL
RBC # BLD AUTO: 5.71 M/UL (ref 4.21–5.77)
RBC #/AREA URNS HPF: ABNORMAL /HPF (ref 0–2)
RBC #/AREA URNS HPF: NORMAL /HPF (ref 0–4)
RENAL EPITHELIAL, UA: ABNORMAL /HPF
SODIUM SERPL-SCNC: 138 MMOL/L (ref 136–145)
SP GR UR STRIP: 1.02 (ref 1–1.03)
SP GR UR STRIP: ABNORMAL (ref 1–1.03)
TEST INFORMATION: ABNORMAL
TRICHOMONAS #/AREA URNS HPF: ABNORMAL /[HPF]
UROBILINOGEN UR STRIP-ACNC: ABNORMAL EU/DL (ref 0–1)
UROBILINOGEN UR STRIP-ACNC: NORMAL EU/DL (ref 0–1)
WBC #/AREA URNS HPF: ABNORMAL /HPF (ref 0–5)
WBC #/AREA URNS HPF: NORMAL /HPF (ref 0–5)
WBC OTHER # BLD: 15.1 K/UL (ref 3.5–11.3)
YEAST URNS QL MICRO: ABNORMAL

## 2024-09-13 PROCEDURE — 95711 VEEG 2-12 HR UNMONITORED: CPT

## 2024-09-13 PROCEDURE — 85055 RETICULATED PLATELET ASSAY: CPT

## 2024-09-13 PROCEDURE — G0480 DRUG TEST DEF 1-7 CLASSES: HCPCS

## 2024-09-13 PROCEDURE — 85025 COMPLETE CBC W/AUTO DIFF WBC: CPT

## 2024-09-13 PROCEDURE — 80177 DRUG SCRN QUAN LEVETIRACETAM: CPT

## 2024-09-13 PROCEDURE — 2580000003 HC RX 258: Performed by: PSYCHIATRY & NEUROLOGY

## 2024-09-13 PROCEDURE — 96374 THER/PROPH/DIAG INJ IV PUSH: CPT

## 2024-09-13 PROCEDURE — 2060000000 HC ICU INTERMEDIATE R&B

## 2024-09-13 PROCEDURE — 93005 ELECTROCARDIOGRAM TRACING: CPT | Performed by: PSYCHIATRY & NEUROLOGY

## 2024-09-13 PROCEDURE — 96375 TX/PRO/DX INJ NEW DRUG ADDON: CPT

## 2024-09-13 PROCEDURE — 6360000002 HC RX W HCPCS: Performed by: PSYCHIATRY & NEUROLOGY

## 2024-09-13 PROCEDURE — 80307 DRUG TEST PRSMV CHEM ANLYZR: CPT

## 2024-09-13 PROCEDURE — 95700 EEG CONT REC W/VID EEG TECH: CPT

## 2024-09-13 PROCEDURE — 81001 URINALYSIS AUTO W/SCOPE: CPT

## 2024-09-13 PROCEDURE — 70450 CT HEAD/BRAIN W/O DYE: CPT

## 2024-09-13 PROCEDURE — 80048 BASIC METABOLIC PNL TOTAL CA: CPT

## 2024-09-13 PROCEDURE — 6360000002 HC RX W HCPCS

## 2024-09-13 PROCEDURE — 2580000003 HC RX 258

## 2024-09-13 PROCEDURE — 99285 EMERGENCY DEPT VISIT HI MDM: CPT

## 2024-09-13 PROCEDURE — 84146 ASSAY OF PROLACTIN: CPT

## 2024-09-13 PROCEDURE — 82550 ASSAY OF CK (CPK): CPT

## 2024-09-13 PROCEDURE — 82947 ASSAY GLUCOSE BLOOD QUANT: CPT

## 2024-09-13 PROCEDURE — 6370000000 HC RX 637 (ALT 250 FOR IP)

## 2024-09-13 RX ORDER — LORAZEPAM 2 MG/ML
1 INJECTION INTRAMUSCULAR ONCE
Status: COMPLETED | OUTPATIENT
Start: 2024-09-13 | End: 2024-09-13

## 2024-09-13 RX ORDER — LORAZEPAM 2 MG/ML
INJECTION INTRAMUSCULAR
Status: COMPLETED
Start: 2024-09-13 | End: 2024-09-13

## 2024-09-13 RX ORDER — LEVETIRACETAM 10 MG/ML
INJECTION INTRAVASCULAR
Status: COMPLETED
Start: 2024-09-13 | End: 2024-09-13

## 2024-09-13 RX ORDER — LEVETIRACETAM 10 MG/ML
2000 INJECTION INTRAVASCULAR ONCE
Status: COMPLETED | OUTPATIENT
Start: 2024-09-13 | End: 2024-09-13

## 2024-09-13 RX ORDER — POTASSIUM CHLORIDE 1500 MG/1
40 TABLET, EXTENDED RELEASE ORAL PRN
Status: DISCONTINUED | OUTPATIENT
Start: 2024-09-13 | End: 2024-09-14 | Stop reason: HOSPADM

## 2024-09-13 RX ORDER — POTASSIUM CHLORIDE 7.45 MG/ML
10 INJECTION INTRAVENOUS PRN
Status: DISCONTINUED | OUTPATIENT
Start: 2024-09-13 | End: 2024-09-14 | Stop reason: HOSPADM

## 2024-09-13 RX ORDER — LEVETIRACETAM 5 MG/ML
500 INJECTION INTRAVASCULAR EVERY 12 HOURS
Status: DISCONTINUED | OUTPATIENT
Start: 2024-09-13 | End: 2024-09-13

## 2024-09-13 RX ORDER — 0.9 % SODIUM CHLORIDE 0.9 %
1000 INTRAVENOUS SOLUTION INTRAVENOUS ONCE
Status: COMPLETED | OUTPATIENT
Start: 2024-09-13 | End: 2024-09-13

## 2024-09-13 RX ORDER — SODIUM CHLORIDE 0.9 % (FLUSH) 0.9 %
5-40 SYRINGE (ML) INJECTION PRN
Status: DISCONTINUED | OUTPATIENT
Start: 2024-09-13 | End: 2024-09-14 | Stop reason: HOSPADM

## 2024-09-13 RX ORDER — ONDANSETRON 4 MG/1
4 TABLET, ORALLY DISINTEGRATING ORAL EVERY 8 HOURS PRN
Status: DISCONTINUED | OUTPATIENT
Start: 2024-09-13 | End: 2024-09-14 | Stop reason: HOSPADM

## 2024-09-13 RX ORDER — ACETAMINOPHEN 325 MG/1
650 TABLET ORAL ONCE
Status: COMPLETED | OUTPATIENT
Start: 2024-09-13 | End: 2024-09-13

## 2024-09-13 RX ORDER — POLYETHYLENE GLYCOL 3350 17 G/17G
17 POWDER, FOR SOLUTION ORAL DAILY PRN
Status: DISCONTINUED | OUTPATIENT
Start: 2024-09-13 | End: 2024-09-14 | Stop reason: HOSPADM

## 2024-09-13 RX ORDER — ACETAMINOPHEN 650 MG/1
650 SUPPOSITORY RECTAL EVERY 6 HOURS PRN
Status: DISCONTINUED | OUTPATIENT
Start: 2024-09-13 | End: 2024-09-14 | Stop reason: HOSPADM

## 2024-09-13 RX ORDER — MAGNESIUM SULFATE IN WATER 40 MG/ML
2000 INJECTION, SOLUTION INTRAVENOUS PRN
Status: DISCONTINUED | OUTPATIENT
Start: 2024-09-13 | End: 2024-09-14 | Stop reason: HOSPADM

## 2024-09-13 RX ORDER — ONDANSETRON 2 MG/ML
4 INJECTION INTRAMUSCULAR; INTRAVENOUS EVERY 6 HOURS PRN
Status: DISCONTINUED | OUTPATIENT
Start: 2024-09-13 | End: 2024-09-14 | Stop reason: HOSPADM

## 2024-09-13 RX ORDER — ENOXAPARIN SODIUM 100 MG/ML
30 INJECTION SUBCUTANEOUS 2 TIMES DAILY
Status: DISCONTINUED | OUTPATIENT
Start: 2024-09-13 | End: 2024-09-14 | Stop reason: HOSPADM

## 2024-09-13 RX ORDER — SODIUM CHLORIDE 9 MG/ML
INJECTION, SOLUTION INTRAVENOUS PRN
Status: DISCONTINUED | OUTPATIENT
Start: 2024-09-13 | End: 2024-09-14 | Stop reason: HOSPADM

## 2024-09-13 RX ORDER — SODIUM CHLORIDE 0.9 % (FLUSH) 0.9 %
5-40 SYRINGE (ML) INJECTION EVERY 12 HOURS SCHEDULED
Status: DISCONTINUED | OUTPATIENT
Start: 2024-09-13 | End: 2024-09-14 | Stop reason: HOSPADM

## 2024-09-13 RX ORDER — ACETAMINOPHEN 325 MG/1
650 TABLET ORAL EVERY 6 HOURS PRN
Status: DISCONTINUED | OUTPATIENT
Start: 2024-09-13 | End: 2024-09-14 | Stop reason: HOSPADM

## 2024-09-13 RX ORDER — LORAZEPAM 2 MG/ML
2 INJECTION INTRAMUSCULAR ONCE
Status: COMPLETED | OUTPATIENT
Start: 2024-09-13 | End: 2024-09-13

## 2024-09-13 RX ORDER — LEVETIRACETAM 500 MG/5ML
500 INJECTION, SOLUTION, CONCENTRATE INTRAVENOUS EVERY 12 HOURS
Status: DISCONTINUED | OUTPATIENT
Start: 2024-09-13 | End: 2024-09-14

## 2024-09-13 RX ADMIN — ACETAMINOPHEN 650 MG: 325 TABLET ORAL at 11:08

## 2024-09-13 RX ADMIN — SODIUM CHLORIDE, PRESERVATIVE FREE 10 ML: 5 INJECTION INTRAVENOUS at 21:45

## 2024-09-13 RX ADMIN — SODIUM CHLORIDE 1000 ML: 9 INJECTION, SOLUTION INTRAVENOUS at 10:37

## 2024-09-13 RX ADMIN — LEVETIRACETAM 2000 MG: 10 INJECTION INTRAVASCULAR at 13:55

## 2024-09-13 RX ADMIN — ENOXAPARIN SODIUM 30 MG: 100 INJECTION SUBCUTANEOUS at 21:45

## 2024-09-13 RX ADMIN — LORAZEPAM 1 MG: 2 INJECTION INTRAMUSCULAR; INTRAVENOUS at 13:47

## 2024-09-13 RX ADMIN — LEVETIRACETAM 500 MG: 100 INJECTION INTRAVENOUS at 21:45

## 2024-09-13 RX ADMIN — LORAZEPAM 2 MG: 2 INJECTION INTRAMUSCULAR; INTRAVENOUS at 13:48

## 2024-09-13 RX ADMIN — LEVETIRACETAM 2000 MG: 10 INJECTION INTRAVENOUS at 13:55

## 2024-09-13 RX ADMIN — LORAZEPAM 1 MG: 2 INJECTION INTRAMUSCULAR at 13:47

## 2024-09-13 ASSESSMENT — PAIN - FUNCTIONAL ASSESSMENT: PAIN_FUNCTIONAL_ASSESSMENT: NONE - DENIES PAIN

## 2024-09-13 ASSESSMENT — ENCOUNTER SYMPTOMS
ABDOMINAL PAIN: 0
SHORTNESS OF BREATH: 0
NAUSEA: 0
VOMITING: 0

## 2024-09-13 ASSESSMENT — PAIN SCALES - GENERAL: PAINLEVEL_OUTOF10: 0

## 2024-09-14 VITALS
OXYGEN SATURATION: 97 % | RESPIRATION RATE: 18 BRPM | TEMPERATURE: 98.6 F | HEIGHT: 65 IN | HEART RATE: 84 BPM | SYSTOLIC BLOOD PRESSURE: 133 MMHG | WEIGHT: 261.91 LBS | BODY MASS INDEX: 43.64 KG/M2 | DIASTOLIC BLOOD PRESSURE: 79 MMHG

## 2024-09-14 LAB
ALBUMIN SERPL-MCNC: 4 G/DL (ref 3.5–5.2)
ALBUMIN/GLOB SERPL: 1 {RATIO} (ref 1–2.5)
ALP SERPL-CCNC: 84 U/L (ref 40–129)
ALT SERPL-CCNC: 9 U/L (ref 10–50)
ANION GAP SERPL CALCULATED.3IONS-SCNC: 9 MMOL/L (ref 9–16)
AST SERPL-CCNC: 18 U/L (ref 10–50)
BASOPHILS # BLD: 0.09 K/UL (ref 0–0.2)
BASOPHILS NFR BLD: 1 % (ref 0–2)
BILIRUB SERPL-MCNC: 0.4 MG/DL (ref 0–1.2)
BUN SERPL-MCNC: 9 MG/DL (ref 6–20)
CALCIUM SERPL-MCNC: 8.5 MG/DL (ref 8.6–10.4)
CHLORIDE SERPL-SCNC: 105 MMOL/L (ref 98–107)
CO2 SERPL-SCNC: 23 MMOL/L (ref 20–31)
CREAT SERPL-MCNC: 0.7 MG/DL (ref 0.7–1.2)
EOSINOPHIL # BLD: 0.18 K/UL (ref 0–0.44)
EOSINOPHILS RELATIVE PERCENT: 2 % (ref 1–4)
ERYTHROCYTE [DISTWIDTH] IN BLOOD BY AUTOMATED COUNT: 13.5 % (ref 11.8–14.4)
GFR, ESTIMATED: >90 ML/MIN/1.73M2
GLUCOSE SERPL-MCNC: 90 MG/DL (ref 74–99)
HCT VFR BLD AUTO: 45.7 % (ref 40.7–50.3)
HGB BLD-MCNC: 14.5 G/DL (ref 13–17)
IMM GRANULOCYTES # BLD AUTO: 0.03 K/UL (ref 0–0.3)
IMM GRANULOCYTES NFR BLD: 0 %
LYMPHOCYTES NFR BLD: 2.63 K/UL (ref 1.1–3.7)
LYMPHOCYTES RELATIVE PERCENT: 22 % (ref 24–43)
MCH RBC QN AUTO: 27.7 PG (ref 25.2–33.5)
MCHC RBC AUTO-ENTMCNC: 31.7 G/DL (ref 28.4–34.8)
MCV RBC AUTO: 87.4 FL (ref 82.6–102.9)
MONOCYTES NFR BLD: 1.1 K/UL (ref 0.1–1.2)
MONOCYTES NFR BLD: 9 % (ref 3–12)
NEUTROPHILS NFR BLD: 66 % (ref 36–65)
NEUTS SEG NFR BLD: 8.02 K/UL (ref 1.5–8.1)
NRBC BLD-RTO: 0 PER 100 WBC
PLATELET # BLD AUTO: 359 K/UL (ref 138–453)
PMV BLD AUTO: 8.7 FL (ref 8.1–13.5)
POTASSIUM SERPL-SCNC: 3.8 MMOL/L (ref 3.7–5.3)
PROT SERPL-MCNC: 7.1 G/DL (ref 6.6–8.7)
RBC # BLD AUTO: 5.23 M/UL (ref 4.21–5.77)
SODIUM SERPL-SCNC: 137 MMOL/L (ref 136–145)
WBC OTHER # BLD: 12.1 K/UL (ref 3.5–11.3)

## 2024-09-14 PROCEDURE — 6360000002 HC RX W HCPCS: Performed by: PSYCHIATRY & NEUROLOGY

## 2024-09-14 PROCEDURE — 95720 EEG PHY/QHP EA INCR W/VEEG: CPT | Performed by: PSYCHIATRY & NEUROLOGY

## 2024-09-14 PROCEDURE — 85025 COMPLETE CBC W/AUTO DIFF WBC: CPT

## 2024-09-14 PROCEDURE — 95711 VEEG 2-12 HR UNMONITORED: CPT

## 2024-09-14 PROCEDURE — 2500000003 HC RX 250 WO HCPCS: Performed by: PSYCHIATRY & NEUROLOGY

## 2024-09-14 PROCEDURE — 80053 COMPREHEN METABOLIC PANEL: CPT

## 2024-09-14 PROCEDURE — 99223 1ST HOSP IP/OBS HIGH 75: CPT | Performed by: PSYCHIATRY & NEUROLOGY

## 2024-09-14 PROCEDURE — 2580000003 HC RX 258: Performed by: PSYCHIATRY & NEUROLOGY

## 2024-09-14 PROCEDURE — 36415 COLL VENOUS BLD VENIPUNCTURE: CPT

## 2024-09-14 RX ORDER — DIVALPROEX SODIUM 500 MG/1
1000 TABLET, FILM COATED, EXTENDED RELEASE ORAL NIGHTLY
Status: DISCONTINUED | OUTPATIENT
Start: 2024-09-14 | End: 2024-09-14 | Stop reason: HOSPADM

## 2024-09-14 RX ORDER — DIVALPROEX SODIUM 500 MG/1
1000 TABLET, FILM COATED, EXTENDED RELEASE ORAL NIGHTLY
Qty: 30 TABLET | Refills: 3 | Status: SHIPPED | OUTPATIENT
Start: 2024-09-14

## 2024-09-14 RX ADMIN — LEVETIRACETAM 500 MG: 100 INJECTION INTRAVENOUS at 08:18

## 2024-09-14 RX ADMIN — VALPROATE SODIUM 1000 MG: 100 INJECTION, SOLUTION INTRAVENOUS at 12:20

## 2024-09-14 RX ADMIN — SODIUM CHLORIDE, PRESERVATIVE FREE 10 ML: 5 INJECTION INTRAVENOUS at 08:19

## 2024-09-14 RX ADMIN — ENOXAPARIN SODIUM 30 MG: 100 INJECTION SUBCUTANEOUS at 08:19

## 2024-09-14 ASSESSMENT — ENCOUNTER SYMPTOMS
RHINORRHEA: 0
EYE ITCHING: 0
BACK PAIN: 0
SHORTNESS OF BREATH: 0
NAUSEA: 0
WHEEZING: 0
EYE DISCHARGE: 0
SORE THROAT: 0
CHEST TIGHTNESS: 0
VOICE CHANGE: 0
CHOKING: 0
CONSTIPATION: 0
ABDOMINAL PAIN: 0
DIARRHEA: 0
EYE REDNESS: 0
ABDOMINAL DISTENTION: 0
COUGH: 0
TROUBLE SWALLOWING: 0
EYE PAIN: 0
PHOTOPHOBIA: 0

## 2024-09-18 LAB
EKG ATRIAL RATE: 109 BPM
EKG P AXIS: 40 DEGREES
EKG P-R INTERVAL: 160 MS
EKG Q-T INTERVAL: 352 MS
EKG QRS DURATION: 114 MS
EKG QTC CALCULATION (BAZETT): 474 MS
EKG R AXIS: -3 DEGREES
EKG T AXIS: 30 DEGREES
EKG VENTRICULAR RATE: 109 BPM

## 2024-10-07 RX ORDER — DIVALPROEX SODIUM 500 MG/1
1000 TABLET, FILM COATED, EXTENDED RELEASE ORAL NIGHTLY
Qty: 180 TABLET | Refills: 1 | Status: SHIPPED | OUTPATIENT
Start: 2024-10-07

## 2024-11-04 ENCOUNTER — HOSPITAL ENCOUNTER (INPATIENT)
Age: 29
LOS: 1 days | Discharge: HOME | DRG: 101 | End: 2024-11-05
Attending: EMERGENCY MEDICINE | Admitting: PSYCHIATRY & NEUROLOGY

## 2024-11-04 ENCOUNTER — APPOINTMENT (OUTPATIENT)
Dept: GENERAL RADIOLOGY | Age: 29
DRG: 101 | End: 2024-11-04

## 2024-11-04 DIAGNOSIS — G40.919 BREAKTHROUGH SEIZURE (HCC): ICD-10-CM

## 2024-11-04 DIAGNOSIS — R56.9 SEIZURE (HCC): Primary | ICD-10-CM

## 2024-11-04 LAB
ANION GAP SERPL CALCULATED.3IONS-SCNC: 13 MMOL/L (ref 9–16)
ANION GAP SERPL CALCULATED.3IONS-SCNC: 28 MMOL/L (ref 9–16)
B PARAP IS1001 DNA NPH QL NAA+NON-PROBE: NOT DETECTED
B PERT DNA SPEC QL NAA+PROBE: NOT DETECTED
BACTERIA URNS QL MICRO: ABNORMAL
BASOPHILS # BLD: 0.1 K/UL (ref 0–0.2)
BASOPHILS NFR BLD: 0 % (ref 0–2)
BILIRUB UR QL STRIP: NEGATIVE
BUN SERPL-MCNC: 13 MG/DL (ref 6–20)
BUN SERPL-MCNC: 14 MG/DL (ref 6–20)
C PNEUM DNA NPH QL NAA+NON-PROBE: NOT DETECTED
CALCIUM SERPL-MCNC: 8.5 MG/DL (ref 8.6–10.4)
CALCIUM SERPL-MCNC: 8.8 MG/DL (ref 8.6–10.4)
CASTS #/AREA URNS LPF: ABNORMAL /LPF (ref 0–8)
CHLORIDE SERPL-SCNC: 102 MMOL/L (ref 98–107)
CHLORIDE SERPL-SCNC: 106 MMOL/L (ref 98–107)
CLARITY UR: ABNORMAL
CO2 SERPL-SCNC: 23 MMOL/L (ref 20–31)
CO2 SERPL-SCNC: 9 MMOL/L (ref 20–31)
COLOR UR: YELLOW
CREAT SERPL-MCNC: 0.9 MG/DL (ref 0.7–1.2)
CREAT SERPL-MCNC: 1 MG/DL (ref 0.7–1.2)
EOSINOPHIL # BLD: <0.03 K/UL (ref 0–0.44)
EOSINOPHILS RELATIVE PERCENT: 0 % (ref 1–4)
EPI CELLS #/AREA URNS HPF: ABNORMAL /HPF (ref 0–5)
ERYTHROCYTE [DISTWIDTH] IN BLOOD BY AUTOMATED COUNT: 13.2 % (ref 11.8–14.4)
FLUAV RNA NPH QL NAA+NON-PROBE: NOT DETECTED
FLUBV RNA NPH QL NAA+NON-PROBE: NOT DETECTED
GFR, ESTIMATED: >90 ML/MIN/1.73M2
GFR, ESTIMATED: >90 ML/MIN/1.73M2
GLUCOSE SERPL-MCNC: 146 MG/DL (ref 74–99)
GLUCOSE SERPL-MCNC: 89 MG/DL (ref 74–99)
GLUCOSE UR STRIP-MCNC: NEGATIVE MG/DL
HADV DNA NPH QL NAA+NON-PROBE: NOT DETECTED
HCOV 229E RNA NPH QL NAA+NON-PROBE: NOT DETECTED
HCOV HKU1 RNA NPH QL NAA+NON-PROBE: NOT DETECTED
HCOV NL63 RNA NPH QL NAA+NON-PROBE: NOT DETECTED
HCOV OC43 RNA NPH QL NAA+NON-PROBE: NOT DETECTED
HCT VFR BLD AUTO: 51.8 % (ref 40.7–50.3)
HGB BLD-MCNC: 16.5 G/DL (ref 13–17)
HGB UR QL STRIP.AUTO: NEGATIVE
HMPV RNA NPH QL NAA+NON-PROBE: NOT DETECTED
HPIV1 RNA NPH QL NAA+NON-PROBE: NOT DETECTED
HPIV2 RNA NPH QL NAA+NON-PROBE: NOT DETECTED
HPIV3 RNA NPH QL NAA+NON-PROBE: NOT DETECTED
HPIV4 RNA NPH QL NAA+NON-PROBE: NOT DETECTED
IMM GRANULOCYTES # BLD AUTO: 0.17 K/UL (ref 0–0.3)
IMM GRANULOCYTES NFR BLD: 1 %
KETONES UR STRIP-MCNC: ABNORMAL MG/DL
L PNEUMO1 AG UR QL IA.RAPID: NEGATIVE
LACTIC ACID, WHOLE BLOOD: 2 MMOL/L (ref 0.7–2.1)
LEUKOCYTE ESTERASE UR QL STRIP: NEGATIVE
LYMPHOCYTES NFR BLD: 1.4 K/UL (ref 1.1–3.7)
LYMPHOCYTES RELATIVE PERCENT: 6 % (ref 24–43)
M PNEUMO DNA NPH QL NAA+NON-PROBE: NOT DETECTED
MCH RBC QN AUTO: 28.6 PG (ref 25.2–33.5)
MCHC RBC AUTO-ENTMCNC: 31.9 G/DL (ref 28.4–34.8)
MCV RBC AUTO: 89.9 FL (ref 82.6–102.9)
MONOCYTES NFR BLD: 1.27 K/UL (ref 0.1–1.2)
MONOCYTES NFR BLD: 6 % (ref 3–12)
NEUTROPHILS NFR BLD: 87 % (ref 36–65)
NEUTS SEG NFR BLD: 19.59 K/UL (ref 1.5–8.1)
NITRITE UR QL STRIP: NEGATIVE
NRBC BLD-RTO: 0 PER 100 WBC
PH UR STRIP: 5 [PH] (ref 5–8)
PLATELET # BLD AUTO: 448 K/UL (ref 138–453)
PMV BLD AUTO: 9.8 FL (ref 8.1–13.5)
POTASSIUM SERPL-SCNC: 4.2 MMOL/L (ref 3.7–5.3)
POTASSIUM SERPL-SCNC: 4.2 MMOL/L (ref 3.7–5.3)
PROCALCITONIN SERPL-MCNC: 0.11 NG/ML (ref 0–0.09)
PROT UR STRIP-MCNC: ABNORMAL MG/DL
RBC # BLD AUTO: 5.76 M/UL (ref 4.21–5.77)
RBC #/AREA URNS HPF: ABNORMAL /HPF (ref 0–4)
RSV RNA NPH QL NAA+NON-PROBE: NOT DETECTED
RV+EV RNA NPH QL NAA+NON-PROBE: NOT DETECTED
S PNEUM AG SPEC QL: NEGATIVE
SARS-COV-2 RNA NPH QL NAA+NON-PROBE: NOT DETECTED
SODIUM SERPL-SCNC: 139 MMOL/L (ref 136–145)
SODIUM SERPL-SCNC: 142 MMOL/L (ref 136–145)
SP GR UR STRIP: 1.01 (ref 1–1.03)
SPECIMEN DESCRIPTION: NORMAL
SPECIMEN SOURCE: NORMAL
UROBILINOGEN UR STRIP-ACNC: NORMAL EU/DL (ref 0–1)
VALPROATE SERPL-MCNC: 20 UG/ML (ref 50–125)
WBC #/AREA URNS HPF: ABNORMAL /HPF (ref 0–5)
WBC OTHER # BLD: 22.5 K/UL (ref 3.5–11.3)

## 2024-11-04 PROCEDURE — 6370000000 HC RX 637 (ALT 250 FOR IP)

## 2024-11-04 PROCEDURE — 96365 THER/PROPH/DIAG IV INF INIT: CPT

## 2024-11-04 PROCEDURE — 95705 EEG W/O VID 2-12 HR UNMNTR: CPT

## 2024-11-04 PROCEDURE — 87641 MR-STAPH DNA AMP PROBE: CPT

## 2024-11-04 PROCEDURE — 85025 COMPLETE CBC W/AUTO DIFF WBC: CPT

## 2024-11-04 PROCEDURE — 80164 ASSAY DIPROPYLACETIC ACD TOT: CPT

## 2024-11-04 PROCEDURE — 2580000003 HC RX 258

## 2024-11-04 PROCEDURE — 86738 MYCOPLASMA ANTIBODY: CPT

## 2024-11-04 PROCEDURE — 0202U NFCT DS 22 TRGT SARS-COV-2: CPT

## 2024-11-04 PROCEDURE — 84145 PROCALCITONIN (PCT): CPT

## 2024-11-04 PROCEDURE — 2500000003 HC RX 250 WO HCPCS

## 2024-11-04 PROCEDURE — 36415 COLL VENOUS BLD VENIPUNCTURE: CPT

## 2024-11-04 PROCEDURE — 2060000000 HC ICU INTERMEDIATE R&B

## 2024-11-04 PROCEDURE — 87899 AGENT NOS ASSAY W/OPTIC: CPT

## 2024-11-04 PROCEDURE — 6360000002 HC RX W HCPCS

## 2024-11-04 PROCEDURE — 93005 ELECTROCARDIOGRAM TRACING: CPT

## 2024-11-04 PROCEDURE — 83605 ASSAY OF LACTIC ACID: CPT

## 2024-11-04 PROCEDURE — 87449 NOS EACH ORGANISM AG IA: CPT

## 2024-11-04 PROCEDURE — 71045 X-RAY EXAM CHEST 1 VIEW: CPT

## 2024-11-04 PROCEDURE — 99285 EMERGENCY DEPT VISIT HI MDM: CPT

## 2024-11-04 PROCEDURE — 81001 URINALYSIS AUTO W/SCOPE: CPT

## 2024-11-04 PROCEDURE — 80048 BASIC METABOLIC PNL TOTAL CA: CPT

## 2024-11-04 RX ORDER — SODIUM CHLORIDE 9 MG/ML
INJECTION, SOLUTION INTRAVENOUS PRN
Status: DISCONTINUED | OUTPATIENT
Start: 2024-11-04 | End: 2024-11-05 | Stop reason: HOSPADM

## 2024-11-04 RX ORDER — POLYETHYLENE GLYCOL 3350 17 G/17G
17 POWDER, FOR SOLUTION ORAL DAILY PRN
Status: DISCONTINUED | OUTPATIENT
Start: 2024-11-04 | End: 2024-11-05 | Stop reason: HOSPADM

## 2024-11-04 RX ORDER — LORAZEPAM 2 MG/ML
2 INJECTION INTRAMUSCULAR ONCE
Status: DISCONTINUED | OUTPATIENT
Start: 2024-11-04 | End: 2024-11-04

## 2024-11-04 RX ORDER — SODIUM CHLORIDE 0.9 % (FLUSH) 0.9 %
5-40 SYRINGE (ML) INJECTION PRN
Status: DISCONTINUED | OUTPATIENT
Start: 2024-11-04 | End: 2024-11-05 | Stop reason: HOSPADM

## 2024-11-04 RX ORDER — ACETAMINOPHEN 650 MG/1
650 SUPPOSITORY RECTAL EVERY 6 HOURS PRN
Status: DISCONTINUED | OUTPATIENT
Start: 2024-11-04 | End: 2024-11-05 | Stop reason: HOSPADM

## 2024-11-04 RX ORDER — POTASSIUM CHLORIDE 1500 MG/1
40 TABLET, EXTENDED RELEASE ORAL PRN
Status: DISCONTINUED | OUTPATIENT
Start: 2024-11-04 | End: 2024-11-05 | Stop reason: HOSPADM

## 2024-11-04 RX ORDER — ACETAMINOPHEN 325 MG/1
650 TABLET ORAL ONCE
Status: COMPLETED | OUTPATIENT
Start: 2024-11-04 | End: 2024-11-04

## 2024-11-04 RX ORDER — LABETALOL HYDROCHLORIDE 5 MG/ML
10 INJECTION, SOLUTION INTRAVENOUS
Status: DISCONTINUED | OUTPATIENT
Start: 2024-11-04 | End: 2024-11-05 | Stop reason: HOSPADM

## 2024-11-04 RX ORDER — POTASSIUM CHLORIDE 7.45 MG/ML
10 INJECTION INTRAVENOUS PRN
Status: DISCONTINUED | OUTPATIENT
Start: 2024-11-04 | End: 2024-11-05 | Stop reason: HOSPADM

## 2024-11-04 RX ORDER — ONDANSETRON 2 MG/ML
4 INJECTION INTRAMUSCULAR; INTRAVENOUS EVERY 6 HOURS PRN
Status: DISCONTINUED | OUTPATIENT
Start: 2024-11-04 | End: 2024-11-05 | Stop reason: HOSPADM

## 2024-11-04 RX ORDER — ONDANSETRON 4 MG/1
4 TABLET, ORALLY DISINTEGRATING ORAL EVERY 8 HOURS PRN
Status: DISCONTINUED | OUTPATIENT
Start: 2024-11-04 | End: 2024-11-05 | Stop reason: HOSPADM

## 2024-11-04 RX ORDER — LORAZEPAM 2 MG/ML
INJECTION INTRAMUSCULAR
Status: COMPLETED
Start: 2024-11-04 | End: 2024-11-04

## 2024-11-04 RX ORDER — LORAZEPAM 2 MG/ML
2 INJECTION INTRAMUSCULAR ONCE
Status: COMPLETED | OUTPATIENT
Start: 2024-11-04 | End: 2024-11-04

## 2024-11-04 RX ORDER — LORAZEPAM 2 MG/ML
4 INJECTION INTRAMUSCULAR EVERY 5 MIN PRN
Status: DISCONTINUED | OUTPATIENT
Start: 2024-11-04 | End: 2024-11-05 | Stop reason: HOSPADM

## 2024-11-04 RX ORDER — SODIUM CHLORIDE 0.9 % (FLUSH) 0.9 %
5-40 SYRINGE (ML) INJECTION EVERY 12 HOURS SCHEDULED
Status: DISCONTINUED | OUTPATIENT
Start: 2024-11-04 | End: 2024-11-05 | Stop reason: HOSPADM

## 2024-11-04 RX ORDER — ACETAMINOPHEN 325 MG/1
650 TABLET ORAL EVERY 6 HOURS PRN
Status: DISCONTINUED | OUTPATIENT
Start: 2024-11-04 | End: 2024-11-05 | Stop reason: HOSPADM

## 2024-11-04 RX ORDER — HYDRALAZINE HYDROCHLORIDE 20 MG/ML
10 INJECTION INTRAMUSCULAR; INTRAVENOUS
Status: DISCONTINUED | OUTPATIENT
Start: 2024-11-04 | End: 2024-11-05 | Stop reason: HOSPADM

## 2024-11-04 RX ORDER — MAGNESIUM SULFATE IN WATER 40 MG/ML
2000 INJECTION, SOLUTION INTRAVENOUS PRN
Status: DISCONTINUED | OUTPATIENT
Start: 2024-11-04 | End: 2024-11-05 | Stop reason: HOSPADM

## 2024-11-04 RX ORDER — ENOXAPARIN SODIUM 100 MG/ML
30 INJECTION SUBCUTANEOUS 2 TIMES DAILY
Status: DISCONTINUED | OUTPATIENT
Start: 2024-11-04 | End: 2024-11-05 | Stop reason: HOSPADM

## 2024-11-04 RX ADMIN — VALPROATE SODIUM 1000 MG: 100 INJECTION, SOLUTION INTRAVENOUS at 15:21

## 2024-11-04 RX ADMIN — ACETAMINOPHEN 650 MG: 325 TABLET ORAL at 15:18

## 2024-11-04 RX ADMIN — DIVALPROEX SODIUM 1250 MG: 250 TABLET, FILM COATED, EXTENDED RELEASE ORAL at 21:42

## 2024-11-04 RX ADMIN — SODIUM CHLORIDE, PRESERVATIVE FREE 10 ML: 5 INJECTION INTRAVENOUS at 21:42

## 2024-11-04 RX ADMIN — LORAZEPAM 2 MG: 2 INJECTION INTRAMUSCULAR; INTRAVENOUS at 13:17

## 2024-11-04 RX ADMIN — ENOXAPARIN SODIUM 30 MG: 100 INJECTION SUBCUTANEOUS at 21:42

## 2024-11-04 RX ADMIN — LORAZEPAM 2 MG: 2 INJECTION INTRAMUSCULAR at 13:17

## 2024-11-04 ASSESSMENT — ENCOUNTER SYMPTOMS
TROUBLE SWALLOWING: 0
CONSTIPATION: 0
VOMITING: 1
SHORTNESS OF BREATH: 1
ABDOMINAL PAIN: 1
DIARRHEA: 0
NAUSEA: 1
RHINORRHEA: 0

## 2024-11-04 ASSESSMENT — PAIN SCALES - GENERAL: PAINLEVEL_OUTOF10: 5

## 2024-11-04 NOTE — ED NOTES
Labeled blood specimens sent to lab via tube system.    [x] Green/yellow  [x] Lavender   [] on ice   [x] Blue   [x] Green/black [] on ice  [] Yellow  [] on ice  [x] Red  [] Pink  [] Blood Cultures  [] x1 [] X2    [] Ped Green  [] on ice  [] Ped Lavender  [] on ice    [] Ped Yellow  [] on ice  [] Ped Red

## 2024-11-04 NOTE — ED NOTES
Writer to bedside, pt found having seizure like activity lasting approximately 1 minute. Dr. Garcia and Dr. Lea called to bedside. Jaw thrust performed and suction used as necessary during seizure. Pt placed on NRB for oxygen saturation in the 70s during seizure. Seizure precautions remain in place.

## 2024-11-04 NOTE — ED PROVIDER NOTES
[facility]     Emergency Department     Faculty Attestation    I performed a history and physical examination of the patient and discussed management with the resident. I have reviewed and agree with the resident’s findings including all diagnostic interpretations, and treatment plans as written at the time of my review. Any areas of disagreement are noted on the chart. I was personally present for the key portions of any procedures. I have documented in the chart those procedures where I was not present during the key portions. For Physician Assistant/ Nurse Practitioner cases/documentation I have personally evaluated this patient and have completed at least one if not all key elements of the E/M (history, physical exam, and MDM). Additional findings are as noted.    PtName: Kane Mijares  MRN: [id number]  Birthdate 1995  Date of evaluation: 11/4/24  Note Started: 12:58 PM EST    Primary Care Physician: No primary care provider on file.        History: This is a 29 y.o. male who presents to the Emergency Department with complaint of seizure.  Patient has a history of seizure disorder.  Recently switched from Keppra to Depakote.  Patient is complaining of a headache.    Physical:   height is 1.702 m (5' 7\") and weight is 120.2 kg (265 lb). His oral temperature is 98.6 °F (37 °C). His blood pressure is 137/98 (abnormal) and his pulse is 66. His respiration is 18 and oxygen saturation is 96%.  Patient awakes easily to verbal stimulus, heart is regular rate he is in no respiratory distress.  Moves all extremities well.  Has no mild cervical spine tenderness    Impression: Seizure    Plan: Acetaminophen, Depakote level    Medical Decision Making  Problems Addressed:  Seizure (HCC): acute illness or injury    Amount and/or Complexity of Data Reviewed  Labs: ordered. Decision-making details documented in ED Course.  ECG/medicine tests: ordered.  Discussion of 
Faculty Sign-Out Attestation  Handoff taken on the following patient from prior Attending Physician: Radha  Note Started: 3:23 PM EST    I was available and discussed any additional care issues that arose and coordinated the management plans with the resident(s) caring for the patient during my duty period. Any areas of disagreement with resident’s documentation of care or procedures are noted on the chart. I was personally present for the key portions of any/all procedures during my duty period. I have documented in the chart those procedures where I was not present during the key portions.    29-year-old male history of seizures presents with breakthrough seizure.  The patient had a second breakthrough seizure while in the emergency department that responded to 2 Ativan 2 mg IV.  Pending labs and reevaluation.  If the patient is back to baseline plan for discharge with follow-up to neurology.      Jefferson Carey DO    Attending Physician        Jefferson Carey DO  11/04/24 1525    
(11/4/2024)    Hunger Vital Sign     Worried About Running Out of Food in the Last Year: Never true     Ran Out of Food in the Last Year: Never true   Transportation Needs: No Transportation Needs (11/4/2024)    PRAPARE - Transportation     Lack of Transportation (Medical): No     Lack of Transportation (Non-Medical): No   Physical Activity: Not on file   Stress: Not on file   Social Connections: Not on file   Intimate Partner Violence: Not on file   Housing Stability: Low Risk  (11/4/2024)    Housing Stability Vital Sign     Unable to Pay for Housing in the Last Year: No     Number of Times Moved in the Last Year: 0     Homeless in the Last Year: No       Family History   Problem Relation Age of Onset    Kidney Disease Brother        Allergies:  Patient has no known allergies.    Home Medications:  Prior to Admission medications    Medication Sig Start Date End Date Taking? Authorizing Provider   divalproex (DEPAKOTE ER) 500 MG extended release tablet TAKE 2 TABLETS BY MOUTH AT BEDTIME 10/7/24  Yes Edgar Martinez S, DO   amLODIPine (NORVASC) 5 MG tablet Take 1 tablet by mouth daily  Patient not taking: Reported on 11/4/2024 7/7/24   Lino Bolivar MD         REVIEW OF SYSTEMS       See HPI    PHYSICAL EXAM      INITIAL VITALS:   BP (!) 149/85   Pulse 98   Temp 98.6 °F (37 °C) (Oral)   Resp 19   Ht 1.702 m (5' 7\")   Wt 120.2 kg (265 lb)   SpO2 91%   BMI 41.50 kg/m²     Physical Exam  Constitutional:       General: He is sleeping.      Appearance: He is ill-appearing.   HENT:      Head: Normocephalic and atraumatic.   Eyes:      Extraocular Movements: Extraocular movements intact.      Conjunctiva/sclera: Conjunctivae normal.      Pupils: Pupils are equal, round, and reactive to light.   Cardiovascular:      Rate and Rhythm: Normal rate and regular rhythm.      Pulses: Normal pulses.      Heart sounds: Normal heart sounds.   Pulmonary:      Effort: Pulmonary effort is normal. No respiratory distress.

## 2024-11-04 NOTE — ED NOTES
ED to inpatient nurses report      Chief Complaint:  Chief Complaint   Patient presents with    Seizures     Present to ED from: home    MOA:     LOC: alert and orientated to name, place, date  Mobility: Requires assistance * 1  Oxygen Baseline: 96%    Current needs required: RA   Pending ED orders: none  Present condition: stable    Why did the patient come to the ED? Pt presents to ED via EMS after having 3 witnessed seizures.  Pt has a hx of seizures and was switched medications in September.  Per EMS, pt's mother witnessed the seizures and called 911.  Upon arrival pt post ictal  What is the plan? admit  Any procedures or intervention occur? EKG, labs, meds, rapid EEG  Any safety concerns??    Mental Status:  Level of Consciousness: Alert (0)    Psych Assessment:   Psychosocial  Psychosocial (WDL): Within Defined Limits  Vital signs   Vitals:    11/04/24 1647 11/04/24 1702 11/04/24 1717 11/04/24 1730   BP: (!) 138/101 (!) 145/100 131/84 (!) 133/91   Pulse: 81 84 85 86   Resp:       Temp:       TempSrc:       SpO2: 96% 97% 97% 96%   Weight:       Height:            Vitals:  Patient Vitals for the past 24 hrs:   BP Temp Temp src Pulse Resp SpO2 Height Weight   11/04/24 1730 (!) 133/91 -- -- 86 -- 96 % -- --   11/04/24 1717 131/84 -- -- 85 -- 97 % -- --   11/04/24 1702 (!) 145/100 -- -- 84 -- 97 % -- --   11/04/24 1647 (!) 138/101 -- -- 81 -- 96 % -- --   11/04/24 1345 (!) 148/87 -- -- 53 -- 95 % -- --   11/04/24 1314 -- -- -- (!) 155 27 97 % -- --   11/04/24 1243 (!) 137/98 98.6 °F (37 °C) Oral 66 18 96 % 1.702 m (5' 7\") 120.2 kg (265 lb)      Visit Vitals  BP (!) 133/91   Pulse 86   Temp 98.6 °F (37 °C) (Oral)   Resp 27   Ht 1.702 m (5' 7\")   Wt 120.2 kg (265 lb)   SpO2 96%   BMI 41.50 kg/m²        LDAs:   Peripheral IV 11/04/24 Left Antecubital (Active)   Site Assessment Clean, dry & intact 11/04/24 1248   Line Status Brisk blood return;Specimen collected;Flushed 11/04/24 1248   Phlebitis Assessment No symptoms

## 2024-11-04 NOTE — ED NOTES
Pt presents to ED via EMS after having 3 witnessed seizures.  Pt has a hx of seizures and was switched medications in September.  Per EMS, pt's mother witnessed the seizures and called 911.  Upon arrival pt post ictal    EKG obtained, labs collected, side rails padded.  Patient alert and oriented x4, talking in complete sentences. Respirations even and unlabored, hooked up to continuous cardiac monitor and pulse oximetry. Call light in reach, all needs met at this time.

## 2024-11-05 VITALS
SYSTOLIC BLOOD PRESSURE: 163 MMHG | BODY MASS INDEX: 41.59 KG/M2 | OXYGEN SATURATION: 99 % | RESPIRATION RATE: 24 BRPM | WEIGHT: 265 LBS | HEIGHT: 67 IN | DIASTOLIC BLOOD PRESSURE: 97 MMHG | HEART RATE: 77 BPM | TEMPERATURE: 98.1 F

## 2024-11-05 PROBLEM — Z91.148 NON COMPLIANCE W MEDICATION REGIMEN: Status: ACTIVE | Noted: 2024-11-05

## 2024-11-05 LAB
ANION GAP SERPL CALCULATED.3IONS-SCNC: 13 MMOL/L (ref 9–16)
BASOPHILS # BLD: 0 K/UL (ref 0–0.2)
BASOPHILS NFR BLD: 0 % (ref 0–2)
BUN SERPL-MCNC: 17 MG/DL (ref 6–20)
CALCIUM SERPL-MCNC: 8.6 MG/DL (ref 8.6–10.4)
CHLORIDE SERPL-SCNC: 104 MMOL/L (ref 98–107)
CO2 SERPL-SCNC: 21 MMOL/L (ref 20–31)
CREAT SERPL-MCNC: 1.2 MG/DL (ref 0.7–1.2)
EKG ATRIAL RATE: 77 BPM
EKG P AXIS: 24 DEGREES
EKG P-R INTERVAL: 162 MS
EKG Q-T INTERVAL: 388 MS
EKG QRS DURATION: 110 MS
EKG QTC CALCULATION (BAZETT): 439 MS
EKG R AXIS: -12 DEGREES
EKG T AXIS: 9 DEGREES
EKG VENTRICULAR RATE: 77 BPM
EOSINOPHIL # BLD: 0 K/UL (ref 0–0.4)
EOSINOPHILS RELATIVE PERCENT: 0 % (ref 1–4)
ERYTHROCYTE [DISTWIDTH] IN BLOOD BY AUTOMATED COUNT: 13.7 % (ref 11.8–14.4)
GFR, ESTIMATED: 83 ML/MIN/1.73M2
GLUCOSE SERPL-MCNC: 98 MG/DL (ref 74–99)
HCT VFR BLD AUTO: 47.8 % (ref 40.7–50.3)
HGB BLD-MCNC: 14.9 G/DL (ref 13–17)
IMM GRANULOCYTES # BLD AUTO: 0 K/UL (ref 0–0.3)
IMM GRANULOCYTES NFR BLD: 0 %
LYMPHOCYTES NFR BLD: 2.9 K/UL (ref 1–4.8)
LYMPHOCYTES RELATIVE PERCENT: 23 % (ref 24–44)
MCH RBC QN AUTO: 27.5 PG (ref 25.2–33.5)
MCHC RBC AUTO-ENTMCNC: 31.2 G/DL (ref 28.4–34.8)
MCV RBC AUTO: 88.4 FL (ref 82.6–102.9)
MONOCYTES NFR BLD: 1.51 K/UL (ref 0.1–0.8)
MONOCYTES NFR BLD: 12 % (ref 1–7)
MORPHOLOGY: NORMAL
NEUTROPHILS NFR BLD: 65 % (ref 36–66)
NEUTS SEG NFR BLD: 8.19 K/UL (ref 1.8–7.7)
NRBC BLD-RTO: 0 PER 100 WBC
PLATELET # BLD AUTO: 336 K/UL (ref 138–453)
PMV BLD AUTO: 9.2 FL (ref 8.1–13.5)
POTASSIUM SERPL-SCNC: 3.6 MMOL/L (ref 3.7–5.3)
RBC # BLD AUTO: 5.41 M/UL (ref 4.21–5.77)
SODIUM SERPL-SCNC: 138 MMOL/L (ref 136–145)
WBC OTHER # BLD: 12.6 K/UL (ref 3.5–11.3)

## 2024-11-05 PROCEDURE — 99239 HOSP IP/OBS DSCHRG MGMT >30: CPT | Performed by: PSYCHIATRY & NEUROLOGY

## 2024-11-05 PROCEDURE — 95700 EEG CONT REC W/VID EEG TECH: CPT

## 2024-11-05 PROCEDURE — 2580000003 HC RX 258

## 2024-11-05 PROCEDURE — 80048 BASIC METABOLIC PNL TOTAL CA: CPT

## 2024-11-05 PROCEDURE — 85025 COMPLETE CBC W/AUTO DIFF WBC: CPT

## 2024-11-05 PROCEDURE — 87040 BLOOD CULTURE FOR BACTERIA: CPT

## 2024-11-05 PROCEDURE — 6370000000 HC RX 637 (ALT 250 FOR IP)

## 2024-11-05 PROCEDURE — 93010 ELECTROCARDIOGRAM REPORT: CPT | Performed by: INTERNAL MEDICINE

## 2024-11-05 PROCEDURE — 95714 VEEG EA 12-26 HR UNMNTR: CPT

## 2024-11-05 PROCEDURE — 95717 EEG PHYS/QHP 2-12 HR W/O VID: CPT | Performed by: PSYCHIATRY & NEUROLOGY

## 2024-11-05 PROCEDURE — 95718 EEG PHYS/QHP 2-12 HR W/VEEG: CPT | Performed by: PSYCHIATRY & NEUROLOGY

## 2024-11-05 PROCEDURE — 36415 COLL VENOUS BLD VENIPUNCTURE: CPT

## 2024-11-05 RX ADMIN — SODIUM CHLORIDE, PRESERVATIVE FREE 10 ML: 5 INJECTION INTRAVENOUS at 09:31

## 2024-11-05 RX ADMIN — POTASSIUM BICARBONATE 20 MEQ: 782 TABLET, EFFERVESCENT ORAL at 11:38

## 2024-11-05 ASSESSMENT — ENCOUNTER SYMPTOMS
DIARRHEA: 0
SHORTNESS OF BREATH: 0
COUGH: 0
NAUSEA: 0
ABDOMINAL DISTENTION: 0
VOMITING: 0
ABDOMINAL PAIN: 0
WHEEZING: 0
CONSTIPATION: 0
STRIDOR: 0

## 2024-11-05 NOTE — H&P
[facility] Neurology   IN-PATIENT SERVICE   [facility]    HISTORY AND PHYSICAL EXAMINATION            Date:   11/4/2024  Patient name:  Kane Mijares  Date of admission:  11/4/2024 12:40 PM  MRN:   [id number]  Account:  [id number]  YOB: 1995  PCP:    No primary care provider on file.  Room:   [facility]  Code Status:    Prior    Chief Complaint:     Chief Complaint   Patient presents with    Seizures     History Obtained From:     patient, patient's mother, electronic medical record    History of Present Illness:     Kane Mijares is a 29 y.o. male with a past medical history of seizure disorder, obesity, and hypertension who presented with multiple breakthrough seizures.  According to the patient's mother, patient woke up this morning around 6 AM sweaty and disoriented.  Thought he was likely postictal.  Patient had several bouts of emesis which is not typical for his seizures.  Then had another episode of GTC seizure at 8:30 AM.  Patient's mother states that she called EMS who evaluated the patient and decided against bring him to the hospital due to patient being back to baseline.  Patient then had another two breakthrough seizures at 11:40 AM and 12:15 PM prompting the patient's mother to bring him into the ED. All seizure episodes lasted approximately 2 minutes and self aborted.  While in the ED, patient was noted to have GTC seizure activity which aborted with Ativan.  Patient was loaded on valproic acid 1 g and connected to rapid EEG which showed signs of seizure activity directly after this event in the ED.  No formal read available at this time.  Patient returned to near baseline but with some residual grogginess likely due to Ativan.  Patient was seen by this service during an admission in September 2024.  At that time, patient had ASMs changed from Keppra to Depakote due to noncompliance because of twice daily dosing.  Patient was discharged home on Depakote 1 g ER nightly.

## 2024-11-05 NOTE — PLAN OF CARE
Problem: Discharge Planning  Goal: Discharge to home or other facility with appropriate resources  Outcome: Progressing     Problem: Neurosensory - Adult  Goal: Achieves stable or improved neurological status  Outcome: Progressing  Goal: Absence of seizures  Outcome: Progressing  Goal: Remains free of injury related to seizures activity  Outcome: Progressing  Goal: Achieves maximal functionality and self care  Outcome: Progressing     Problem: Coping  Goal: Pt/Family able to verbalize concerns and demonstrate effective coping strategies  Description: INTERVENTIONS:  1. Assist patient/family to identify coping skills, available support systems and cultural and spiritual values  2. Provide emotional support, including active listening and acknowledgement of concerns of patient and caregivers  3. Reduce environmental stimuli, as able  4. Instruct patient/family in relaxation techniques, as appropriate  5. Assess for spiritual pain/suffering and initiate Spiritual Care, Psychosocial Clinical Specialist consults as needed  Outcome: Progressing     Problem: Behavior  Goal: Pt/Family maintain appropriate behavior and adhere to behavioral management agreement, if implemented  Description: INTERVENTIONS:  1. Assess patient/family's coping skills and  non-compliant behavior (including use of illegal substances)  2. Notify security of behavior or suspected illegal substances which indicate the need for search of the family and/or belongings  3. Encourage verbalization of thoughts and concerns in a socially appropriate manner  4. Utilize positive, consistent limit setting strategies supporting safety of patient, staff and others  5. Encourage participation in the decision making process about the behavioral management agreement  6. If a visitor's behavior poses a threat to safety call refer to organization policy.  7. Initiate consult with , Psychosocial CNS, Spiritual Care as appropriate  Outcome: Progressing

## 2024-11-05 NOTE — PROCEDURES
PROCEDURE NOTE  Date: 11/5/2024   Name: Kane Mijares  YOB: 1995    Procedures            Date: 11/4/2024  Referring physician: Dr. Zabrina Greene  Patient aged 29 y with seizures. EEG done to assess for epileptiform activity.    Introduction  This routine 5 hrs-minute EEG was recorded using the [employer] Local one band system. Automated seizure detection algorithms were applied.    Description  The background consistent of diffuse none reactive polymorphic delta and theta slowing with spindle like activity. No consistent focal slowing or interhemispheric asymmetry was noted. Stage I and stage II sleep were not observed. There were no interictal epileptiform discharges or electrographic seizures.    Activations  Hyperventilation was not performed. Intermittent photic stimulation was performed and demonstrated no posterior driving response.    Impression  Abnormal awake EEG. The slowing mentioned above suggests moderate non specific encephalopathy.     No epileptiform discharges were identified. Please note the absence of such activity on this record cannot conclusively rule out an epileptic disorder. If such is still clinically suspected, a repeat study with sleep deprivation and/or prolonged sampling may be helpful.    Please note this EEG was meant to screen for emergent condition and is prone to artifact and with some limitations. The interpretation of this EEG result should be taken only with clinical correlation. Ideally regular EEG with full leads should be considered when possible.     Uvaldo Burton MD  Epilepsy Board Certified.  Neurology Board Certified.    Electronically Signed

## 2024-11-05 NOTE — PROGRESS NOTES
Writer went over discharge paperwork with patient. All questions answered. Patient waiting for ride to pick him up.

## 2024-11-05 NOTE — PROCEDURES
PROCEDURE NOTE  Date: 11/5/2024   Name: Kane Mijares  YOB: 1995    Procedures            Date: 11/5/2024  Referring physician: Dr. Zabrina Greene  Patient aged 29 y with possible seizures. EEG done to assess for epileptiform activity.    Introduction  This 5 hrs vEEG was recorded using the International 10-20 System on a [employer] workstation at 256 samples/s. Automated spike and seizure detection algorithms were applied.    Description  During the maximal alert state, a well-regulated, symmetric, and reactive 9 Hz posterior dominant rhythm was seen. No consistent focal slowing or interhemispheric asymmetry was noted. Stage I and stage II sleep were observed. There were no interictal epileptiform discharges or electrographic seizures.    Activations  Hyperventilation was not performed. Intermittent photic stimulation was performed and demonstrated no posterior driving response.    Impression  Normal awake and sleep EEG.       EKG lead did not show clear arrhythmia, if still in concern consider formal EKG or correlation with telemetry.       No epileptiform discharges were identified. Please note the absence of such activity on this record cannot conclusively rule out an epileptic disorder. If such is still clinically suspected, a repeat study with sleep deprivation and/or prolonged sampling may be helpful.    Uvaldo Burton MD  Epilepsy Board Certified.  Neurology Board Certified.    Electronically Signed

## 2024-11-05 NOTE — PROGRESS NOTES
[facility]  Occupational Therapy Not Seen Note    DATE: 2024    NAME: Kane Mijares  MRN: [id number]   : 1995      Patient not seen this date for Occupational Therapy due to:    Patient is not appropriate for active participation in OT evaluation/treatment at this time d/t agitation per RN. OT will check back as able.    Next Scheduled Treatment:       Electronically signed by MORA Owen on 2024 at 11:49 AM

## 2024-11-05 NOTE — PLAN OF CARE
Problem: Discharge Planning  Goal: Discharge to home or other facility with appropriate resources  11/5/2024 1212 by Liliya Nunes RN  Outcome: Completed  11/4/2024 2246 by Anabell Hoffman RN  Outcome: Progressing     Problem: Neurosensory - Adult  Goal: Achieves stable or improved neurological status  11/5/2024 1212 by Liliya Nunes RN  Outcome: Completed  11/4/2024 2246 by Anabell Hoffman RN  Outcome: Progressing  Goal: Absence of seizures  11/5/2024 1212 by Liliya Nunes RN  Outcome: Completed  11/4/2024 2246 by Anabell Hoffman RN  Outcome: Progressing  Goal: Remains free of injury related to seizures activity  11/5/2024 1212 by Liliya Nunes RN  Outcome: Completed  11/4/2024 2246 by Anabell Hoffman RN  Outcome: Progressing  Goal: Achieves maximal functionality and self care  11/5/2024 1212 by Liliya Nunes RN  Outcome: Completed  11/4/2024 2246 by Anabell Hoffman RN  Outcome: Progressing     Problem: Coping  Goal: Pt/Family able to verbalize concerns and demonstrate effective coping strategies  Description: INTERVENTIONS:  1. Assist patient/family to identify coping skills, available support systems and cultural and spiritual values  2. Provide emotional support, including active listening and acknowledgement of concerns of patient and caregivers  3. Reduce environmental stimuli, as able  4. Instruct patient/family in relaxation techniques, as appropriate  5. Assess for spiritual pain/suffering and initiate Spiritual Care, Psychosocial Clinical Specialist consults as needed  11/5/2024 1212 by Liliya Nunes RN  Outcome: Completed  11/4/2024 2246 by Anabell Hoffman RN  Outcome: Progressing     Problem: Behavior  Goal: Pt/Family maintain appropriate behavior and adhere to behavioral management agreement, if implemented  Description: INTERVENTIONS:  1. Assess patient/family's coping skills and  non-compliant behavior (including use of illegal substances)  2. Notify security of behavior or suspected

## 2024-11-05 NOTE — DISCHARGE SUMMARY
noncompliance because of twice daily dosing.  Patient was discharged home on Depakote 1 g ER nightly.  Patient and mother both state compliance with this medication regimen, although valproic acid level low in ED at 10.  No focal deficits on exam.     Patient was admitted to the neurology service for further evaluation management of breakthrough seizures.    No further seizures while inpatient. Patient expressed he would like to be discharged with outpatient follow up. He reported that he missed a few doses of Depakote. Patient counseled on importance of Depakote compliance. Patient states he does not drive. Discussed all seizure precautions with patient and patient verbalized understanding.    WBCs trended down 22.5 > 12.6. CXR showed no acute process. Blood cultures no growth at 12 hours. Pro nicole 0.11.  UA negative.     Review of Systems   Constitutional:  Negative for activity change, chills, fatigue and fever.   Respiratory:  Negative for cough, shortness of breath, wheezing and stridor.    Cardiovascular:  Negative for chest pain, palpitations and leg swelling.   Gastrointestinal:  Negative for abdominal distention, abdominal pain, constipation, diarrhea, nausea and vomiting.   Neurological:  Negative for dizziness, tremors, seizures, syncope, speech difficulty, weakness, light-headedness, numbness and headaches.             CONSTITUTIONAL:  Well developed, well nourished, alert and oriented x 3, in no acute distress. GCS 15, nontoxic. No dysarthria, no aphasia. EOMI.    HEAD:  normocephalic, atraumatic    EYES:  PERRLA, EOMI.   ENT:  moist mucous membranes   NECK:  supple, symmetric, no midline tenderness to palpation    BACK:  without midline tenderness, step-offs or deformities    LUNGS:  Equal air entry bilaterally   CARDIOVASCULAR:  normal s1 / s2   ABDOMEN:  Soft, no rigidity   NEUROLOGIC:  Mental Status:  A & O x3,awake             Cranial Nerves:    cranial nerves II-XII are grossly intact     Motor

## 2024-11-05 NOTE — DISCHARGE INSTRUCTIONS
No driving for at least 6 months.  No heavy lifting for at least 6 months  No bathing or swimming unsupervised  Avoid locking doors, it prevents some to help you if needed  Avoid stairs unsupervised  Avoid cooking unsupervised     Avoid alcohol and illicit substances  Follow up outpatient Neurology Dr. Jessica Bailon  Continue Depakote 1000 mg ER tablet nightly

## 2024-11-06 LAB — M PNEUMO IGM SER QL IA: 0.27

## 2024-11-08 LAB
MRSA, DNA, NASAL: NEGATIVE
SPECIMEN DESCRIPTION: NORMAL

## 2024-12-07 ENCOUNTER — HOSPITAL ENCOUNTER (INPATIENT)
Age: 29
LOS: 2 days | Discharge: HOME | DRG: 101 | End: 2024-12-09
Attending: EMERGENCY MEDICINE | Admitting: STUDENT IN AN ORGANIZED HEALTH CARE EDUCATION/TRAINING PROGRAM

## 2024-12-07 ENCOUNTER — APPOINTMENT (OUTPATIENT)
Dept: CT IMAGING | Age: 29
DRG: 101 | End: 2024-12-07

## 2024-12-07 ENCOUNTER — APPOINTMENT (OUTPATIENT)
Dept: GENERAL RADIOLOGY | Age: 29
DRG: 101 | End: 2024-12-07

## 2024-12-07 DIAGNOSIS — R56.9 SEIZURE (HCC): Primary | ICD-10-CM

## 2024-12-07 DIAGNOSIS — G40.919 BREAKTHROUGH SEIZURE (HCC): ICD-10-CM

## 2024-12-07 LAB
ALBUMIN SERPL-MCNC: 4.4 G/DL (ref 3.5–5.2)
ALBUMIN/GLOB SERPL: 1.2 {RATIO} (ref 1–2.5)
ALP SERPL-CCNC: 81 U/L (ref 40–129)
ALT SERPL-CCNC: 11 U/L (ref 10–50)
ANION GAP SERPL CALCULATED.3IONS-SCNC: 19 MMOL/L (ref 9–16)
AST SERPL-CCNC: 26 U/L (ref 10–50)
BASOPHILS # BLD: 0 K/UL (ref 0–0.2)
BASOPHILS NFR BLD: 0 %
BILIRUB SERPL-MCNC: <0.2 MG/DL (ref 0–1.2)
BUN SERPL-MCNC: 10 MG/DL (ref 6–20)
CALCIUM SERPL-MCNC: 8.9 MG/DL (ref 8.6–10.4)
CHLORIDE SERPL-SCNC: 104 MMOL/L (ref 98–107)
CO2 SERPL-SCNC: 15 MMOL/L (ref 20–31)
CREAT SERPL-MCNC: 0.7 MG/DL (ref 0.7–1.2)
EOSINOPHIL # BLD: 0 K/UL (ref 0–0.4)
EOSINOPHILS RELATIVE PERCENT: 0 % (ref 1–4)
ERYTHROCYTE [DISTWIDTH] IN BLOOD BY AUTOMATED COUNT: 13.4 % (ref 11.8–14.4)
GFR, ESTIMATED: >90 ML/MIN/1.73M2
GLUCOSE SERPL-MCNC: 94 MG/DL (ref 74–99)
HCT VFR BLD AUTO: 50.8 % (ref 40.7–50.3)
HGB BLD-MCNC: 16.7 G/DL (ref 13–17)
IMM GRANULOCYTES # BLD AUTO: 0 K/UL (ref 0–0.3)
IMM GRANULOCYTES NFR BLD: 0 %
LYMPHOCYTES NFR BLD: 1.3 K/UL (ref 1–4.8)
LYMPHOCYTES RELATIVE PERCENT: 5 % (ref 24–44)
MCH RBC QN AUTO: 28.7 PG (ref 25.2–33.5)
MCHC RBC AUTO-ENTMCNC: 32.9 G/DL (ref 28.4–34.8)
MCV RBC AUTO: 87.4 FL (ref 82.6–102.9)
MONOCYTES NFR BLD: 1.55 K/UL (ref 0.2–0.8)
MONOCYTES NFR BLD: 6 % (ref 1–7)
MORPHOLOGY: ABNORMAL
MORPHOLOGY: ABNORMAL
NEUTROPHILS NFR BLD: 89 % (ref 36–66)
NEUTS SEG NFR BLD: 23.05 K/UL (ref 1.8–7.7)
NRBC BLD-RTO: 0 PER 100 WBC
PLATELET # BLD AUTO: 325 K/UL (ref 138–453)
PMV BLD AUTO: 9.4 FL (ref 8.1–13.5)
POTASSIUM SERPL-SCNC: 4.7 MMOL/L (ref 3.7–5.3)
PROT SERPL-MCNC: 8.1 G/DL (ref 6.6–8.7)
RBC # BLD AUTO: 5.81 M/UL (ref 4.21–5.77)
SODIUM SERPL-SCNC: 137 MMOL/L (ref 136–145)
TROPONIN I SERPL HS-MCNC: 11 NG/L (ref 0–22)
VALPROATE SERPL-MCNC: 53 UG/ML (ref 50–125)
WBC OTHER # BLD: 25.9 K/UL (ref 3.5–11.3)

## 2024-12-07 PROCEDURE — 99223 1ST HOSP IP/OBS HIGH 75: CPT | Performed by: INTERNAL MEDICINE

## 2024-12-07 PROCEDURE — 99285 EMERGENCY DEPT VISIT HI MDM: CPT

## 2024-12-07 PROCEDURE — 6360000002 HC RX W HCPCS: Performed by: EMERGENCY MEDICINE

## 2024-12-07 PROCEDURE — 96374 THER/PROPH/DIAG INJ IV PUSH: CPT

## 2024-12-07 PROCEDURE — 71045 X-RAY EXAM CHEST 1 VIEW: CPT

## 2024-12-07 PROCEDURE — 96375 TX/PRO/DX INJ NEW DRUG ADDON: CPT

## 2024-12-07 PROCEDURE — 85025 COMPLETE CBC W/AUTO DIFF WBC: CPT

## 2024-12-07 PROCEDURE — 80164 ASSAY DIPROPYLACETIC ACD TOT: CPT

## 2024-12-07 PROCEDURE — 80053 COMPREHEN METABOLIC PANEL: CPT

## 2024-12-07 PROCEDURE — 1200000000 HC SEMI PRIVATE

## 2024-12-07 PROCEDURE — 6360000002 HC RX W HCPCS

## 2024-12-07 PROCEDURE — 84484 ASSAY OF TROPONIN QUANT: CPT

## 2024-12-07 PROCEDURE — 81001 URINALYSIS AUTO W/SCOPE: CPT

## 2024-12-07 PROCEDURE — 70450 CT HEAD/BRAIN W/O DYE: CPT

## 2024-12-07 PROCEDURE — 80307 DRUG TEST PRSMV CHEM ANLYZR: CPT

## 2024-12-07 RX ORDER — ACETAMINOPHEN 650 MG/1
650 SUPPOSITORY RECTAL EVERY 6 HOURS PRN
Status: DISCONTINUED | OUTPATIENT
Start: 2024-12-07 | End: 2024-12-09 | Stop reason: HOSPADM

## 2024-12-07 RX ORDER — POTASSIUM CHLORIDE 1500 MG/1
40 TABLET, EXTENDED RELEASE ORAL PRN
Status: DISCONTINUED | OUTPATIENT
Start: 2024-12-07 | End: 2024-12-09 | Stop reason: HOSPADM

## 2024-12-07 RX ORDER — LEVETIRACETAM 500 MG/5ML
1000 INJECTION, SOLUTION, CONCENTRATE INTRAVENOUS ONCE
Status: COMPLETED | OUTPATIENT
Start: 2024-12-07 | End: 2024-12-07

## 2024-12-07 RX ORDER — SODIUM CHLORIDE 0.9 % (FLUSH) 0.9 %
5-40 SYRINGE (ML) INJECTION PRN
Status: DISCONTINUED | OUTPATIENT
Start: 2024-12-07 | End: 2024-12-09 | Stop reason: HOSPADM

## 2024-12-07 RX ORDER — SODIUM CHLORIDE 9 MG/ML
INJECTION, SOLUTION INTRAVENOUS PRN
Status: DISCONTINUED | OUTPATIENT
Start: 2024-12-07 | End: 2024-12-09 | Stop reason: HOSPADM

## 2024-12-07 RX ORDER — ACETAMINOPHEN 325 MG/1
650 TABLET ORAL EVERY 6 HOURS PRN
Status: DISCONTINUED | OUTPATIENT
Start: 2024-12-07 | End: 2024-12-09 | Stop reason: HOSPADM

## 2024-12-07 RX ORDER — LORAZEPAM 2 MG/ML
INJECTION INTRAMUSCULAR
Status: COMPLETED
Start: 2024-12-07 | End: 2024-12-07

## 2024-12-07 RX ORDER — LORAZEPAM 2 MG/ML
2 INJECTION INTRAMUSCULAR EVERY 5 MIN PRN
Status: DISCONTINUED | OUTPATIENT
Start: 2024-12-07 | End: 2024-12-09 | Stop reason: HOSPADM

## 2024-12-07 RX ORDER — POLYETHYLENE GLYCOL 3350 17 G/17G
17 POWDER, FOR SOLUTION ORAL DAILY PRN
Status: DISCONTINUED | OUTPATIENT
Start: 2024-12-07 | End: 2024-12-09 | Stop reason: HOSPADM

## 2024-12-07 RX ORDER — ONDANSETRON 2 MG/ML
4 INJECTION INTRAMUSCULAR; INTRAVENOUS EVERY 6 HOURS PRN
Status: DISCONTINUED | OUTPATIENT
Start: 2024-12-07 | End: 2024-12-09 | Stop reason: HOSPADM

## 2024-12-07 RX ORDER — SODIUM CHLORIDE 0.9 % (FLUSH) 0.9 %
5-40 SYRINGE (ML) INJECTION EVERY 12 HOURS SCHEDULED
Status: DISCONTINUED | OUTPATIENT
Start: 2024-12-07 | End: 2024-12-09 | Stop reason: HOSPADM

## 2024-12-07 RX ORDER — POTASSIUM CHLORIDE 7.45 MG/ML
10 INJECTION INTRAVENOUS PRN
Status: DISCONTINUED | OUTPATIENT
Start: 2024-12-07 | End: 2024-12-09 | Stop reason: HOSPADM

## 2024-12-07 RX ORDER — ONDANSETRON 4 MG/1
4 TABLET, ORALLY DISINTEGRATING ORAL EVERY 8 HOURS PRN
Status: DISCONTINUED | OUTPATIENT
Start: 2024-12-07 | End: 2024-12-09 | Stop reason: HOSPADM

## 2024-12-07 RX ORDER — DIVALPROEX SODIUM 500 MG/1
500 TABLET, FILM COATED, EXTENDED RELEASE ORAL ONCE
Status: COMPLETED | OUTPATIENT
Start: 2024-12-07 | End: 2024-12-08

## 2024-12-07 RX ORDER — MAGNESIUM SULFATE IN WATER 40 MG/ML
2000 INJECTION, SOLUTION INTRAVENOUS PRN
Status: DISCONTINUED | OUTPATIENT
Start: 2024-12-07 | End: 2024-12-09 | Stop reason: HOSPADM

## 2024-12-07 RX ORDER — LORAZEPAM 2 MG/ML
2 INJECTION INTRAMUSCULAR ONCE
Status: COMPLETED | OUTPATIENT
Start: 2024-12-07 | End: 2024-12-07

## 2024-12-07 RX ADMIN — LORAZEPAM 2 MG: 2 INJECTION INTRAMUSCULAR at 20:10

## 2024-12-07 RX ADMIN — LEVETIRACETAM 1000 MG: 100 INJECTION INTRAVENOUS at 20:14

## 2024-12-07 RX ADMIN — LORAZEPAM 2 MG: 2 INJECTION INTRAMUSCULAR; INTRAVENOUS at 20:10

## 2024-12-08 LAB
AMPHET UR QL SCN: NEGATIVE
ANION GAP SERPL CALCULATED.3IONS-SCNC: 12 MMOL/L (ref 9–16)
BARBITURATES UR QL SCN: NEGATIVE
BASOPHILS # BLD: 0 K/UL (ref 0–0.2)
BASOPHILS NFR BLD: 0 %
BENZODIAZ UR QL: POSITIVE
BILIRUB UR QL STRIP: NEGATIVE
BUN SERPL-MCNC: 11 MG/DL (ref 6–20)
CALCIUM SERPL-MCNC: 8.5 MG/DL (ref 8.6–10.4)
CANNABINOIDS UR QL SCN: POSITIVE
CASTS #/AREA URNS LPF: ABNORMAL /LPF
CASTS #/AREA URNS LPF: ABNORMAL /LPF
CHLORIDE SERPL-SCNC: 107 MMOL/L (ref 98–107)
CLARITY UR: CLEAR
CO2 SERPL-SCNC: 21 MMOL/L (ref 20–31)
COCAINE UR QL SCN: NEGATIVE
COLOR UR: YELLOW
CREAT SERPL-MCNC: 0.8 MG/DL (ref 0.7–1.2)
CRYSTALS URNS MICRO: ABNORMAL /HPF
EOSINOPHIL # BLD: 0 K/UL (ref 0–0.4)
EOSINOPHILS RELATIVE PERCENT: 0 % (ref 1–4)
EPI CELLS #/AREA URNS HPF: ABNORMAL /HPF (ref 0–5)
ERYTHROCYTE [DISTWIDTH] IN BLOOD BY AUTOMATED COUNT: 13.6 % (ref 11.8–14.4)
ETHANOL PERCENT: 0 %
ETHANOLAMINE SERPL-MCNC: <10 MG/DL (ref 0–0.08)
FENTANYL UR QL: NEGATIVE
GFR, ESTIMATED: >90 ML/MIN/1.73M2
GLUCOSE SERPL-MCNC: 90 MG/DL (ref 74–99)
GLUCOSE UR STRIP-MCNC: NEGATIVE MG/DL
HCT VFR BLD AUTO: 46.6 % (ref 40.7–50.3)
HGB BLD-MCNC: 15.1 G/DL (ref 13–17)
HGB UR QL STRIP.AUTO: NEGATIVE
IMM GRANULOCYTES # BLD AUTO: 0 K/UL (ref 0–0.3)
IMM GRANULOCYTES NFR BLD: 0 %
KETONES UR STRIP-MCNC: ABNORMAL MG/DL
LACTATE BLDV-SCNC: 0.8 MMOL/L (ref 0.5–2.2)
LEUKOCYTE ESTERASE UR QL STRIP: ABNORMAL
LYMPHOCYTES NFR BLD: 2.75 K/UL (ref 1–4.8)
LYMPHOCYTES RELATIVE PERCENT: 16 % (ref 24–44)
MCH RBC QN AUTO: 28.5 PG (ref 25.2–33.5)
MCHC RBC AUTO-ENTMCNC: 32.4 G/DL (ref 28.4–34.8)
MCV RBC AUTO: 88.1 FL (ref 82.6–102.9)
METHADONE UR QL: NEGATIVE
MONOCYTES NFR BLD: 1.72 K/UL (ref 0.2–0.8)
MONOCYTES NFR BLD: 10 % (ref 1–7)
MUCOUS THREADS URNS QL MICRO: ABNORMAL
NEUTROPHILS NFR BLD: 74 % (ref 36–66)
NEUTS SEG NFR BLD: 12.73 K/UL (ref 1.8–7.7)
NITRITE UR QL STRIP: NEGATIVE
NRBC BLD-RTO: 0 PER 100 WBC
OPIATES UR QL SCN: NEGATIVE
OXYCODONE UR QL SCN: NEGATIVE
PCP UR QL SCN: NEGATIVE
PH UR STRIP: 5.5 [PH] (ref 5–8)
PLATELET # BLD AUTO: 311 K/UL (ref 138–453)
PMV BLD AUTO: 9.3 FL (ref 8.1–13.5)
POTASSIUM SERPL-SCNC: 3.9 MMOL/L (ref 3.7–5.3)
PROT UR STRIP-MCNC: ABNORMAL MG/DL
RBC # BLD AUTO: 5.29 M/UL (ref 4.21–5.77)
RBC #/AREA URNS HPF: ABNORMAL /HPF (ref 0–2)
SODIUM SERPL-SCNC: 140 MMOL/L (ref 136–145)
SP GR UR STRIP: 1.02 (ref 1–1.03)
TEST INFORMATION: ABNORMAL
UROBILINOGEN UR STRIP-ACNC: NORMAL EU/DL (ref 0–1)
WBC #/AREA URNS HPF: ABNORMAL /HPF (ref 0–5)
WBC OTHER # BLD: 17.2 K/UL (ref 3.5–11.3)

## 2024-12-08 PROCEDURE — 36415 COLL VENOUS BLD VENIPUNCTURE: CPT

## 2024-12-08 PROCEDURE — 6370000000 HC RX 637 (ALT 250 FOR IP): Performed by: STUDENT IN AN ORGANIZED HEALTH CARE EDUCATION/TRAINING PROGRAM

## 2024-12-08 PROCEDURE — 6370000000 HC RX 637 (ALT 250 FOR IP): Performed by: NURSE PRACTITIONER

## 2024-12-08 PROCEDURE — 95819 EEG AWAKE AND ASLEEP: CPT | Performed by: PSYCHIATRY & NEUROLOGY

## 2024-12-08 PROCEDURE — 99254 IP/OBS CNSLTJ NEW/EST MOD 60: CPT | Performed by: PSYCHIATRY & NEUROLOGY

## 2024-12-08 PROCEDURE — 95816 EEG AWAKE AND DROWSY: CPT

## 2024-12-08 PROCEDURE — 94761 N-INVAS EAR/PLS OXIMETRY MLT: CPT

## 2024-12-08 PROCEDURE — 99232 SBSQ HOSP IP/OBS MODERATE 35: CPT | Performed by: STUDENT IN AN ORGANIZED HEALTH CARE EDUCATION/TRAINING PROGRAM

## 2024-12-08 PROCEDURE — 1200000000 HC SEMI PRIVATE

## 2024-12-08 PROCEDURE — 2700000000 HC OXYGEN THERAPY PER DAY

## 2024-12-08 PROCEDURE — 6370000000 HC RX 637 (ALT 250 FOR IP): Performed by: EMERGENCY MEDICINE

## 2024-12-08 PROCEDURE — G0480 DRUG TEST DEF 1-7 CLASSES: HCPCS

## 2024-12-08 PROCEDURE — 2580000003 HC RX 258: Performed by: INTERNAL MEDICINE

## 2024-12-08 PROCEDURE — 85025 COMPLETE CBC W/AUTO DIFF WBC: CPT

## 2024-12-08 PROCEDURE — 83605 ASSAY OF LACTIC ACID: CPT

## 2024-12-08 PROCEDURE — 80048 BASIC METABOLIC PNL TOTAL CA: CPT

## 2024-12-08 RX ORDER — AMLODIPINE BESYLATE 5 MG/1
5 TABLET ORAL DAILY
Status: DISCONTINUED | OUTPATIENT
Start: 2024-12-08 | End: 2024-12-09 | Stop reason: HOSPADM

## 2024-12-08 RX ORDER — DIVALPROEX SODIUM 500 MG/1
1000 TABLET, FILM COATED, EXTENDED RELEASE ORAL 2 TIMES DAILY
Status: DISCONTINUED | OUTPATIENT
Start: 2024-12-08 | End: 2024-12-09 | Stop reason: HOSPADM

## 2024-12-08 RX ADMIN — SODIUM CHLORIDE, PRESERVATIVE FREE 10 ML: 5 INJECTION INTRAVENOUS at 20:20

## 2024-12-08 RX ADMIN — DIVALPROEX SODIUM 1000 MG: 500 TABLET, EXTENDED RELEASE ORAL at 20:19

## 2024-12-08 RX ADMIN — Medication 6 MG: at 20:19

## 2024-12-08 RX ADMIN — AMLODIPINE BESYLATE 5 MG: 5 TABLET ORAL at 12:40

## 2024-12-08 RX ADMIN — DIVALPROEX SODIUM 1000 MG: 500 TABLET, EXTENDED RELEASE ORAL at 08:32

## 2024-12-08 RX ADMIN — SODIUM CHLORIDE, PRESERVATIVE FREE 10 ML: 5 INJECTION INTRAVENOUS at 00:23

## 2024-12-08 RX ADMIN — DIVALPROEX SODIUM 500 MG: 500 TABLET, EXTENDED RELEASE ORAL at 00:18

## 2024-12-08 RX ADMIN — SODIUM CHLORIDE, PRESERVATIVE FREE 10 ML: 5 INJECTION INTRAVENOUS at 09:00

## 2024-12-08 NOTE — ED PROVIDER NOTES
polyethylene glycol (GLYCOLAX) packet 17 g    OR Linked Order Group     acetaminophen (TYLENOL) tablet 650 mg     acetaminophen (TYLENOL) suppository 650 mg     DISCHARGE PRESCRIPTIONS:  Current Discharge Medication List        PHYSICIAN CONSULTS ORDERED THIS ENCOUNTER:  IP CONSULT TO NEUROLOGY  IP CONSULT TO INTERNAL MEDICINE  FINAL IMPRESSION      1. Seizure (HCC)    2. Breakthrough seizure (HCC)          DISPOSITION/PLAN   DISPOSITION Admitted 12/07/2024 11:04:42 PM               OUTPATIENT FOLLOW UP THE PATIENT:  No follow-up provider specified.    Erica B Goldberger, MD Goldberger, Erica B, MD  12/08/24 0218

## 2024-12-08 NOTE — CARE COORDINATION
Case Management Assessment  Initial Evaluation    Date/Time of Evaluation: 12/8/2024 4:25 PM  Assessment Completed by: VAL PERSAUD RN    If patient is discharged prior to next notation, then this note serves as note for discharge by case management.    Patient Name: Kane Mijares                   YOB: 1995  Diagnosis: Seizure (HCC) [R56.9]  Breakthrough seizure (HCC) [G40.919]                   Date / Time: 12/7/2024  7:39 PM    Patient Admission Status: Inpatient   Readmission Risk (Low < 19, Mod (19-27), High > 27): Readmission Risk Score: 15.6    Current PCP: No primary care provider on file.  PCP verified by CM? No (list provided.)    Chart Reviewed: Yes      History Provided by: Patient  Patient Orientation: Alert and Oriented, Person, Place, Situation, Self    Patient Cognition: Alert    Hospitalization in the last 30 days (Readmission):  No    If yes, Readmission Assessment in CM Navigator will be completed.    Advance Directives:      Code Status: Full Code   Patient's Primary Decision Maker is: Legal Next of Kin      Discharge Planning:    Patient lives with: Parent Type of Home: House  Primary Care Giver: Self  Patient Support Systems include: Parent   Current Financial resources: HELP  Current community resources: None  Current services prior to admission: None            Current DME:              Type of Home Care services:  None    ADLS  Prior functional level: Independent in ADLs/IADLs  Current functional level: Assistance with the following:, Cooking, Housework, Shopping    PT AM-PAC:   /24  OT AM-PAC:   /24    Family can provide assistance at DC: Yes  Would you like Case Management to discuss the discharge plan with any other family members/significant others, and if so, who? Yes (parents)  Plans to Return to Present Housing: Yes  Other Identified Issues/Barriers to RETURNING to current housing: medical condition  Potential Assistance needed at discharge: N/A            Potential

## 2024-12-08 NOTE — CONSULTS
[facility]  [address], [address]  [place] [address]  Ph: [phone] or [phone]  FAX: [phone]      Reason for Consult: Seizures    I had the pleasure of seeing your patient, . Kane Mijares, in neurology consultation for his seizure disorder. Mr. Mijares is a 29-year-old male with a past medical history significant for obesity, anxiety, and a seizure disorder initially diagnosed in July 2024. He presented with multiple breakthrough seizures and is currently hospitalized for further evaluation and management. His clinical course has been complicated by medication noncompliance and a low therapeutic level of Depakote.    Mr. Mijares first experienced seizure episodes this past summer. He was initially started on Keppra but reported irritability and personality changes, prompting a transition to Depakote ER 1000 mg once daily. Despite reported compliance, his recent Depakote levels were noted to be in the subtherapeutic range (10 mcg/mL), likely contributing to his breakthrough seizures. On the day of admission, he experienced six seizure episodes, including generalized tonic-clonic events with associated postictal confusion, urinary incontinence, and one witnessed episode in the emergency department that responded to Ativan.  His seizure events are described as typically brief, lasting approximately two minutes, and self-limiting. They are preceded by an aura but not associated with tongue biting. There were no reported injuries or diffuse body pain. While admitted, Mr. Mijares received a loading dose of Keppra in the emergency department and was subsequently maintained on an increased dose of Depakote ER (1250 mg daily).  He denies any recent illness, fever, or substance abuse. Toxicology and alcohol screens are pending. CT imaging was unremarkable, and an EEG revealed findings of encephalopathy without epileptiform discharges.        /71   Pulse 94   Temp 98.6 °F

## 2024-12-08 NOTE — PLAN OF CARE
Patient resting in bed on 2L via nasal cannula which is new for him, continuous pulse ox maintained and oxygen saturation within defined limits,Patient used urinal at the bedside, urine was discarded before sample could be taken, Urine sample still needed.   Patient on seizure precautions with telesitter in place.  Patient is lethargic, but will answer questions and follow commands, alert and oriented x4.  Oral depakote administered, see MAR.   Patient safety maintained.     Problem: Discharge Planning  Goal: Discharge to home or other facility with appropriate resources  Outcome: Progressing     Problem: Safety - Adult  Goal: Free from fall injury  Outcome: Progressing     Problem: Neurosensory - Adult  Goal: Achieves stable or improved neurological status  Outcome: Progressing     Problem: Neurosensory - Adult  Goal: Absence of seizures  Outcome: Progressing     Problem: Neurosensory - Adult  Goal: Remains free of injury related to seizures activity  Outcome: Progressing     Problem: Neurosensory - Adult  Goal: Achieves maximal functionality and self care  Outcome: Progressing     Problem: Respiratory - Adult  Goal: Achieves optimal ventilation and oxygenation  Outcome: Progressing

## 2024-12-08 NOTE — H&P
[facility]  Office: [phone]  Jose Gupta DO, Guille Ruiz DO, Jorge De Souza DO, Randell Arriaga DO, Jean Paul Murillo MD, Linda Rivers MD, Susan Parra MD, Krystin Tubbs MD,  Carlo Salter MD, Marcus Lewis MD, Bryant Olmstead MD,  Uvaldo Massey DO, Juanita Adrian MD, Caden Dwyer MD, Dao Gupta DO, Lizeth Payton MD,  Edy Maharaj DO, Crystal Dozier MD, Alice Miller MD, Caryn Helm MD, Howard Wolfe MD,  Bird Bridges MD, Ruthy Bryson MD, Isabelle Ornelas MD, Jordi Lan MD, Yehuda Walker MD, Fabricio Gomez MD, Toby Mon DO, Josue Romero MD, Shirley Waterhouse, CNP,  Gudelia Nick CNP, Toby Dempsey, JAMISON,  Trudy Hale, ARELY, Maryanne Mccoy, CNP, Shobha Lynch, CNP, Dominique Franklin, CNP, Nina Landin, CNP, Nano Neff PA-C, Genesis Sigala PA-C, Olena Craven, CNP, Chelsey Pacheco, CNP,  Brenda Flynn, CNP, Eliana Ames, CNP,  Cheyenne Monson, CNP, Jaida Osborne, CNP         [facility]   IN-PATIENT SERVICE   [facility]    HISTORY AND PHYSICAL EXAMINATION            Date:   12/7/2024  Patient name:  Kane Mijares  Date of admission:  12/7/2024  7:39 PM  MRN:   [id number]  Account:  [id number]  YOB: 1995  PCP:    No primary care provider on file.  Room:   [place] [id number]  Code Status:    Full Code    Chief Complaint:     Chief Complaint   Patient presents with    Seizures     Px arrives complaining of multiple seizures (6) that have occurred today. Px states he initially started having seizures this summer, and is on the waiting list to be seen by neurologist. Px states he is on seizure meds and takes as prescribed        History Obtained From:     patient    History of Present Illness:     Kane Mijares is a obese male with underlying anxiety and recent diagnosis of seizure disorder. Patient was worked up with formal ct/mri/eeg ext with questionable clinical presentation. He has been to ER for intractable seizure

## 2024-12-08 NOTE — ED NOTES
Patient continues resting in bed with eyes closed, side rails up, bed low, rail pads in place, patient on 4L/NC supplemental O2, mother at bedside.

## 2024-12-08 NOTE — ED NOTES
Patient remains resting in bed with eyes closed, bed low, side rails with pads up, mother at bedside, supplemental O2 via NC remains in place.

## 2024-12-08 NOTE — ED NOTES
ED to inpatient nurses report     Chief Complaint   Patient presents with    Seizures     Px arrives complaining of multiple seizures (6) that have occurred today. Px states he initially started having seizures this summer, and is on the waiting list to be seen by neurologist. Px states he is on seizure meds and takes as prescribed       Present to ED from home via family  LOC: alert and orientated to name, place, date - initially upon arrival.   Vital signs   Vitals:    12/07/24 1930 12/07/24 2136 12/07/24 2201   BP: (!) 157/98 137/77 136/77   Pulse: (!) 104     Resp: 15     Temp: 98.1 °F (36.7 °C)     TempSrc: Oral     SpO2: 99% 96% 97%   Weight: 113.4 kg (250 lb)        Oxygen Baseline None    Current needs required 4L/NC  SEPSIS: NO  [N] Lactate X 2 ordered (Yes or No)  [N] Antibiotics given (Yes or No)  [N] IV Fluids ordered (Yes or No)             [N] 2nd IV completed (Yes or No)  [N] Hourly Vital Signs (Validated)  [N] Outstanding Orders:     LDAs:   Peripheral IV 12/07/24 Left;Proximal;Anterior Forearm (Active)   Site Assessment Clean, dry & intact 12/07/24 2001   Line Status Blood return noted;Flushed;Normal saline locked;Specimen collected 12/07/24 2001   Dressing Status New dressing applied;Clean, dry & intact 12/07/24 2001     Mobility: Fully dependent  Fall Risk:    Pending ED orders: 500 mg PO Depakote ER (patient not alert enough to take), Urinalysis and urine drug (no specimen)  Present condition: Stable  Code Status: Full  Consults: Intermed  [x]  Hospitalist  Completed  [x] yes [] no Who: Intermed  []  Medicine  Completed  [] yes [] No Who:   []  Cardiology  Completed  [] yes [] No Who:   []  GI   Completed  [] yes [] No Who:   [x]  Neurology  Completed  [x] yes [] No Who: Dr. Nazario Chung  []  Nephrology Completed  [] yes [] No Who:    []  Vascular  Completed  [] yes [] No Who:   []  Ortho  Completed  [] yes [] No Who:     []  Surgery  Completed  [] yes [] No Who:    []  Urology  Completed  [] yes

## 2024-12-08 NOTE — ED NOTES
Patient started actively seizing, tonic/clonic, unresponsive, safe in bed with pads in place, mother was at bedside. IVP ativan, 2 mg given, patient placed on non-rebreather, seizure lasted about 5 minutes.

## 2024-12-09 VITALS
RESPIRATION RATE: 16 BRPM | DIASTOLIC BLOOD PRESSURE: 92 MMHG | WEIGHT: 213.9 LBS | BODY MASS INDEX: 35.64 KG/M2 | OXYGEN SATURATION: 94 % | SYSTOLIC BLOOD PRESSURE: 140 MMHG | HEIGHT: 65 IN | HEART RATE: 73 BPM | TEMPERATURE: 98.2 F

## 2024-12-09 PROCEDURE — 2580000003 HC RX 258: Performed by: INTERNAL MEDICINE

## 2024-12-09 PROCEDURE — 99238 HOSP IP/OBS DSCHRG MGMT 30/<: CPT | Performed by: INTERNAL MEDICINE

## 2024-12-09 PROCEDURE — 99232 SBSQ HOSP IP/OBS MODERATE 35: CPT | Performed by: PSYCHIATRY & NEUROLOGY

## 2024-12-09 PROCEDURE — 6370000000 HC RX 637 (ALT 250 FOR IP): Performed by: STUDENT IN AN ORGANIZED HEALTH CARE EDUCATION/TRAINING PROGRAM

## 2024-12-09 RX ORDER — AMLODIPINE BESYLATE 5 MG/1
5 TABLET ORAL DAILY
Qty: 30 TABLET | Refills: 3 | Status: SHIPPED | OUTPATIENT
Start: 2024-12-09

## 2024-12-09 RX ORDER — DIVALPROEX SODIUM 250 MG/1
1250 TABLET, DELAYED RELEASE ORAL 2 TIMES DAILY
Qty: 300 TABLET | Refills: 3 | Status: SHIPPED | OUTPATIENT
Start: 2024-12-09 | End: 2025-01-08

## 2024-12-09 RX ADMIN — SODIUM CHLORIDE, PRESERVATIVE FREE 10 ML: 5 INJECTION INTRAVENOUS at 08:22

## 2024-12-09 RX ADMIN — AMLODIPINE BESYLATE 5 MG: 5 TABLET ORAL at 08:22

## 2024-12-09 RX ADMIN — DIVALPROEX SODIUM 1000 MG: 500 TABLET, EXTENDED RELEASE ORAL at 08:22

## 2024-12-09 NOTE — PLAN OF CARE
Pt had an uneventful night with no witness of fall or seizure. He continues to ambulate to the bathroom independent. Depakote 1000mg given. PRN melatonin given to help with sleep.     Problem: Discharge Planning  Goal: Discharge to home or other facility with appropriate resources  Outcome: Progressing  Discharge to home or other facility with appropriate resources:   Arrange for needed discharge resources and transportation as appropriate   Identify discharge learning needs (meds, wound care, etc)      Problem: Safety - Adult  Goal: Free from fall injury  Outcome: Progressing  Free From Fall Injury:   Instruct family/caregiver on patient safety   Based on caregiver fall risk screen, instruct family/caregiver to ask for assistance with transferring infant if caregiver noted to have fall risk factors      Problem: Neurosensory - Adult  Goal: Achieves stable or improved neurological status  Outcome: Progressing  Achieves stable or improved neurological status:   Assess for and report changes in neurological status   Initiate measures to prevent increased intracranial pressure   Maintain blood pressure and fluid volume within ordered parameters to optimize cerebral perfusion and minimize risk of hemorrhage      Problem: Neurosensory - Adult  Goal: Absence of seizures  Outcome: Progressing  Absence of seizures:   Monitor for seizure activity.  If seizure occurs, document type and location of movements and any associated apnea   If seizure occurs, turn head to side and suction secretions as needed   Administer anticonvulsants as ordered

## 2024-12-09 NOTE — PLAN OF CARE
Shift uneventful, call received from mother, general update given, EEG completed, results pending, no seizure activity noted, tele-sitter remains in place, no c/o pain or discomfort voiced, plans to return home when medically stable    Problem: Safety - Adult  Goal: Free from fall injury  Outcome: Progressing     Problem: Neurosensory - Adult  Goal: Achieves stable or improved neurological status  Outcome: Progressing     Problem: Discharge Planning  Goal: Discharge to home or other facility with appropriate resources  Outcome: Progressing

## 2024-12-09 NOTE — PROCEDURES
Valerie Alexandra MD        polyethylene glycol (GLYCOLAX) packet 17 g  17 g Oral Daily PRN Valerie Alexandra MD        acetaminophen (TYLENOL) tablet 650 mg  650 mg Oral Q6H PRN Valerie Alexandra MD        Or    acetaminophen (TYLENOL) suppository 650 mg  650 mg Rectal Q6H PRN Valerie Alexandra MD            Technical Description: This is a 21 channel digital EEG recording with time-locked video. Electrodes were placed in accordance with the 10-20 International System of Electrode Placement. Single lead EKG monitoring as well as temporal electrodes were included.    The patient was not sleep deprived. This recording was obtained during wakefulness.    EEG Description: The dominant background activity during maximal recorded wakefulness consisted of bioccipitally dominant 10 Hz, 25-35 uV symmetric, regular activity that was reactive to eye opening. During drowsiness, the background rhythm waxed and waned and there were periods of slowing. During stage II sleep symmetric V waves, K complexes, and sleep spindles were seen. There was appropriate diffuse delta activity during slow wave sleep.    Photic stimulation - stepwise photic stimulation at 2-30 Hz was performed and there was a biposterior, symmetric, driving response.    Hyperventilation - was  performed.     No abnormalities were activated by photic stimulation     The EKG channel demonstrated a normal sinus rhythm.    Interpretation  This EEG was normal in wakefulness and sleep.     Clinical correlation  This EEG was normal. No focal or epileptiform abnormalities were seen.    Jonah Gerber MD  [facility]

## 2024-12-09 NOTE — PROGRESS NOTES
All patient belongings collected. IV and telemetry removed. Discharge paperwork given and explained to patient regarding new scripts to  and follow up appointments to attend. Patient on M2B list but will have family come  scripts later this afternoon/evening due to not having money with him. [facility] phoned and made aware. Patient given PCP list by CM. Patient wheeled out to private auto via staff. See discharge event for further details.       
[facility]  Office: [phone]  Jose Gupta DO, Guille Ruiz DO, Jorge De Souza DO, Randell Arriaga DO, Jean Paul Murillo MD, Linda Rivers MD, Susan Parra MD, Krystin Tubbs MD,  Carlo Salter MD, Marcus Lewis MD, Bryant Olmstead MD,  Uvaldo Massey DO, Juanita Adrian MD, Caden Dwyer MD, Dao Gupta DO, Lizeth Payton MD,  Edy Maharaj DO, Crystal Dozier MD, Alice Miller MD, Caryn Helm MD, Howard Wolfe MD,  Bird Bridges MD, Ruthy Bryson MD, Isabelle Ornelas MD, Jordi Lan MD, Yehuda Walker MD, Fabricio Gomez MD, Toby Mon DO, Josue Romero MD, Shirley Waterhouse, CNP,  Gudelia Nick CNP, Toby Dempsey, JAMISON,  Trudy Hale, ARELY, Maryanne Mccoy, CNP, Shobha Lynch, CNP, Dominique Franklin, CNP, Nina Landin, CNP, Nano Neff PA-C, Genesis Sigala PA-C, Olena Craven, JAMISON, Chelsey Pacehco, CNP,  Brenda Flynn, CNP, Eliana Ames, CNP,  Cheyenne Monson, CNP, Jaida Osborne, CNP         [facility]   IN-PATIENT SERVICE   [facility]    Progress Note    12/9/2024    12:52 PM    Name:   Kane Mijares  MRN:     [id number]     Acct:      [id number]   Room:   1016/1016-02   Day:  2  Admit Date:  12/7/2024  7:39 PM    PCP:   No primary care provider on file.  Code Status:  Full Code    Subjective:     C/C:   Chief Complaint   Patient presents with    Seizures     Px arrives complaining of multiple seizures (6) that have occurred today. Px states he initially started having seizures this summer, and is on the waiting list to be seen by neurologist. Px states he is on seizure meds and takes as prescribed      Interval History Status: improved.     No further seizures  Wants to go home    Brief History:     Per my partner:  \"29-year-old male with history of essential hypertension, seizure disorder presented with concern for multiple seizures, witnessed by family, no urinary/bowel incontinence however did bite his tongue, states that he has been 
EEG completed; full report to follow.  
Patient admitted to room 1016  VS taken, telemetry placed and call light given/within reach. Patient A&O and given room orientation. Orders released/reviewed, initial assessment completed and navigator started. See chart for more detail.     [x] Medication Reconciliation was completed and the patient's home medication list was verified. The Med List Status is \"Complete\". The following sources were used to assist with Medication Reconciliation:    [] Med Rec Pharmacist already completed   [] Patient had a list of medications which was transcribed into the EHR.  [] Patient provided bottles of their medications  [x] Home medications reviewed and confirmed with patient  [] Contacted patient's pharmacy to confirm home medications  [] Contacted patient's physician office to confirm home medications  [] Medical Records from another facility and/or Care Everywhere were reviewed  [] MAR from facility requested to be faxed over  [] Unable to complete due to patient condition  [] Unable to validate home medications      [] There are one or more home medications that need clarification before Medication Reconciliation can be completed. The Med List Status has been marked as In Progress.     To assist with Home Medication Reconciliation the following actions have been taken:    [] Pharmacy medication reconciliation service requested. (Note: This can be done by sending a Perfect Serve message to The Med Rec Pharmacist or by phoning [phone].)  [] Family requested to bring medications into the hospital  [] Family requested to call hospital with medication list  [] Message left with physician office  [] Request for medical records made to   [] Other          
Pt upset, states that he needs to know what the hold up is with his discharge, Dr Ornelas made aware, states that she is not discharging patient at this time and the plan is to continue to monitor, became vulgar, accused the staff of making him stay and demanded the writer leave his room, explained that he could leave if desired AMA, ordered writer out of the room and states that he aba not be eating dinner  
88.1   MCH 28.7 28.5   MCHC 32.9 32.4   RDW 13.4 13.6    311   MPV 9.4 9.3     Chemistry:  Recent Labs     12/07/24 1949 12/08/24  0316    140   K 4.7 3.9    107   CO2 15* 21   GLUCOSE 94 90   BUN 10 11   CREATININE 0.7 0.8   ANIONGAP 19* 12   LABGLOM >90 >90   CALCIUM 8.9 8.5*   TROPHS 11  --      Recent Labs     12/07/24 1949   AST 26   ALT 11   ALKPHOS 81   BILITOT <0.2     ABG:  Lab Results   Component Value Date/Time    POCPH 7.252 07/04/2024 12:15 AM    POCPCO2 40.2 07/04/2024 12:15 AM    POCPO2 313.6 07/04/2024 12:15 AM    POCHCO3 17.7 07/04/2024 12:15 AM    NBEA 9.0 07/04/2024 12:15 AM    UZLF8VKL 99.9 07/04/2024 12:15 AM    FIO2 100.0 07/04/2024 12:15 AM     Lab Results   Component Value Date/Time    SPECIAL LEFT HAND 6ML 11/05/2024 12:00 AM    SPECIAL RIGHT HAND 6ML 11/05/2024 12:00 AM     Lab Results   Component Value Date/Time    CULTURE NO GROWTH 5 DAYS 11/05/2024 12:00 AM    CULTURE NO GROWTH 5 DAYS 11/05/2024 12:00 AM       Radiology:  XR CHEST PORTABLE    Result Date: 12/7/2024  No acute process.     CT HEAD WO CONTRAST    Result Date: 12/7/2024  1 no acute intracranial findings. 1. Mild mucosal thickening in the frontal, ethmoid and maxillary sinuses.       Physical Examination:        General appearance:  alert, cooperative and no distress  Mental Status:  oriented to person, place and time and normal affect  Lungs:  clear to auscultation bilaterally, normal effort  Heart:  regular rate and rhythm, no murmur  Abdomen:  soft, nontender, nondistended, normal bowel sounds, no masses, hepatomegaly, splenomegaly  Extremities:  no edema, redness, tenderness in the calves  Skin:  no gross lesions, rashes, induration    Assessment:        Hospital Problems             Last Modified POA    * (Principal) Seizure (HCC) 12/7/2024 Yes       Plan:      Breakthrough seizures with history of seizure disorder  Concern for medication noncompliance in the past    -Continue increased level of 
Pain  [] Palpitations  [] Lightheaded   GI [] Constipation  [] Diarrhea  [] Swallowing change  [] Nausea/vomiting    [] Urinary Frequency  [] Urinary Urgency   Musculoskeletal [] Neck pain  [] Back pain  [] Muscle pain  [] Restless legs   Dermatologic [] Skin changes   Neurologic [] Memory loss/confusion  [] Seizures  [] Trouble walking or imbalance  [] Dizziness  [] Sleep disturbance  [] Weakness  [] Numbness  [] Tremors  [] Speech Difficulty  [] Headaches  [] Light Sensitivity  [] Sound Sensitivity   Endocrinology []Excessive thirst  []Excessive hunger   Psychiatric [] Anxiety/Depression  [] Hallucination   Allergy/immunology []Hives/environmental allergies   Hematologic/lymph [] Abnormal bleeding  [] Abnormal bruising       General Examination:  Head: Normocephalic, atraumatic  Eyes: PERRLA, EOMI  Lungs: Clear to auscultation bilaterally, no respiratory distress  ENT: Normal mucosa  Heart: Regular rate and rhythm  Abdomen: Soft, non-tender, no rigidity  Extremities: No cyanosis, clubbing, or edema  Skin: Intact, no rashes      Neurological Examination:  Domain Findings   Mental Status Alert, oriented x3; intact memory and comprehension. No confusion.   Cranial Nerves CN II-XII intact. EOMI, no nystagmus, symmetrical palate, normal hearing.   Motor Symmetrical 5/5 strength in all extremities; normal tone and bulk.   Sensory Intact to light touch, vibration, and proprioception.   Reflexes 1+ and symmetric; downgoing plantar responses bilaterally.   Cerebellar Normal finger-nose and heel-shin coordination; no tremors.   Gait Deferred due to recent seizures.       Neurological Workup:  Date Study Findings   12/7/2024 CT Head Unremarkable.   12/7/2024 EEG Moderate nonspecific encephalopathy; no epileptiform discharges noted.   7/4/2024 MRI Brain Normal.   7/4/2024 Long-term EEG Diffuse beta activity consistent with medication use; no seizures recorded.     Seizure History:  First Seizure Seizure Semiology Last

## 2024-12-09 NOTE — DISCHARGE SUMMARY
states he is on seizure meds and takes as prescribed )    Admitted with breakthrough seizure with subtherapeutic depakote levels, seen by neurology and had depakote increased to 1250mg bid.  Encouraged compliance with medications    Eeg done after loading seizure meds and was then normal      Significant therapeutic interventions: see above    Significant Diagnostic Studies:   Labs / Micro:  CBC:   Lab Results   Component Value Date/Time    WBC 17.2 12/08/2024 03:16 AM    RBC 5.29 12/08/2024 03:16 AM    HGB 15.1 12/08/2024 03:16 AM    HCT 46.6 12/08/2024 03:16 AM    MCV 88.1 12/08/2024 03:16 AM    MCH 28.5 12/08/2024 03:16 AM    MCHC 32.4 12/08/2024 03:16 AM    RDW 13.6 12/08/2024 03:16 AM     12/08/2024 03:16 AM     CMP:    Lab Results   Component Value Date/Time    GLUCOSE 90 12/08/2024 03:16 AM     12/08/2024 03:16 AM    K 3.9 12/08/2024 03:16 AM     12/08/2024 03:16 AM    CO2 21 12/08/2024 03:16 AM    BUN 11 12/08/2024 03:16 AM    CREATININE 0.8 12/08/2024 03:16 AM    ANIONGAP 12 12/08/2024 03:16 AM    ALKPHOS 81 12/07/2024 07:49 PM    ALT 11 12/07/2024 07:49 PM    AST 26 12/07/2024 07:49 PM    BILITOT <0.2 12/07/2024 07:49 PM    ALBUMIN 4.4 12/07/2024 07:49 PM    LABGLOM >90 12/08/2024 03:16 AM    GFRAA >60 11/27/2014 10:38 PM    GFR      11/27/2014 10:38 PM    GFR NOT REPORTED 11/27/2014 10:38 PM    CALCIUM 8.5 12/08/2024 03:16 AM        Radiology:  XR CHEST PORTABLE    Result Date: 12/7/2024  No acute process.     CT HEAD WO CONTRAST    Result Date: 12/7/2024  1 no acute intracranial findings. 1. Mild mucosal thickening in the frontal, ethmoid and maxillary sinuses.       Consultations:    Consults:     Final Specialist Recommendations/Findings:   IP CONSULT TO INTERNAL MEDICINE  IP CONSULT TO NEUROLOGY      The patient was seen and examined on day of discharge and this discharge summary is in conjunction with any daily progress note from day of discharge.    Discharge plan:

## 2024-12-09 NOTE — CARE COORDINATION
Discharge planning    Met with patient and showed him [facility] for good rx. [employer] is granting rx assist but he verbalized understanding of how good rx works. Has PCP list. Also provided clinic list specifically for patient without insurance. Encouraged to follow up and make an appt.

## 2025-03-29 ENCOUNTER — HOSPITAL ENCOUNTER (OUTPATIENT)
Age: 30
Setting detail: OBSERVATION
Discharge: HOME | End: 2025-03-31
Attending: EMERGENCY MEDICINE | Admitting: INTERNAL MEDICINE
Payer: MEDICAID

## 2025-03-29 DIAGNOSIS — G40.919 BREAKTHROUGH SEIZURE (HCC): ICD-10-CM

## 2025-03-29 DIAGNOSIS — I48.0 PAROXYSMAL ATRIAL FIBRILLATION (HCC): Primary | ICD-10-CM

## 2025-03-29 PROBLEM — E66.812 CLASS 2 OBESITY WITH BODY MASS INDEX (BMI) OF 37.0 TO 37.9 IN ADULT: Status: ACTIVE | Noted: 2025-03-29

## 2025-03-29 LAB
AMPHET UR QL SCN: NEGATIVE
ANION GAP SERPL CALCULATED.3IONS-SCNC: 14 MMOL/L (ref 9–16)
BARBITURATES UR QL SCN: NEGATIVE
BASOPHILS # BLD: 0 K/UL (ref 0–0.2)
BASOPHILS NFR BLD: 0 % (ref 0–2)
BENZODIAZ UR QL: NEGATIVE
BILIRUB UR QL STRIP: NEGATIVE
BUN SERPL-MCNC: 16 MG/DL (ref 6–20)
CALCIUM SERPL-MCNC: 8.7 MG/DL (ref 8.6–10.4)
CANNABINOIDS UR QL SCN: POSITIVE
CHLORIDE SERPL-SCNC: 104 MMOL/L (ref 98–107)
CLARITY UR: CLEAR
CO2 SERPL-SCNC: 21 MMOL/L (ref 20–31)
COCAINE UR QL SCN: NEGATIVE
COLOR UR: YELLOW
CREAT SERPL-MCNC: 0.7 MG/DL (ref 0.7–1.2)
EOSINOPHIL # BLD: 0.14 K/UL (ref 0–0.44)
EOSINOPHILS RELATIVE PERCENT: 1 % (ref 1–4)
ERYTHROCYTE [DISTWIDTH] IN BLOOD BY AUTOMATED COUNT: 14.4 % (ref 11.8–14.4)
FENTANYL UR QL: NEGATIVE
GFR, ESTIMATED: >90 ML/MIN/1.73M2
GLUCOSE SERPL-MCNC: 130 MG/DL (ref 74–99)
GLUCOSE UR STRIP-MCNC: NEGATIVE MG/DL
HCT VFR BLD AUTO: 45.8 % (ref 40.7–50.3)
HGB BLD-MCNC: 15.6 G/DL (ref 13–17)
HGB UR QL STRIP.AUTO: NEGATIVE
IMM GRANULOCYTES # BLD AUTO: 0 K/UL (ref 0–0.3)
IMM GRANULOCYTES NFR BLD: 0 %
KETONES UR STRIP-MCNC: NEGATIVE MG/DL
LEUKOCYTE ESTERASE UR QL STRIP: NEGATIVE
LYMPHOCYTES NFR BLD: 2.12 K/UL (ref 1.1–3.7)
LYMPHOCYTES RELATIVE PERCENT: 15 % (ref 24–43)
MCH RBC QN AUTO: 31.8 PG (ref 25.2–33.5)
MCHC RBC AUTO-ENTMCNC: 34.1 G/DL (ref 28.4–34.8)
MCV RBC AUTO: 93.5 FL (ref 82.6–102.9)
METHADONE UR QL: NEGATIVE
MONOCYTES NFR BLD: 1.55 K/UL (ref 0.1–1.2)
MONOCYTES NFR BLD: 11 % (ref 3–12)
NEUTROPHILS NFR BLD: 73 % (ref 36–65)
NEUTS SEG NFR BLD: 10.29 K/UL (ref 1.5–8.1)
NITRITE UR QL STRIP: NEGATIVE
NRBC BLD-RTO: 0 PER 100 WBC
OPIATES UR QL SCN: NEGATIVE
OXYCODONE UR QL SCN: NEGATIVE
PCP UR QL SCN: NEGATIVE
PH UR STRIP: 7 [PH] (ref 5–8)
PLATELET # BLD AUTO: 272 K/UL (ref 138–453)
PMV BLD AUTO: 9 FL (ref 8.1–13.5)
POTASSIUM SERPL-SCNC: 3.6 MMOL/L (ref 3.7–5.3)
PROT UR STRIP-MCNC: NEGATIVE MG/DL
RBC # BLD AUTO: 4.9 M/UL (ref 4.21–5.77)
SODIUM SERPL-SCNC: 139 MMOL/L (ref 136–145)
SP GR UR STRIP: 1.01 (ref 1–1.03)
TEST INFORMATION: ABNORMAL
TSH SERPL DL<=0.05 MIU/L-ACNC: 2.03 UIU/ML (ref 0.27–4.2)
UROBILINOGEN UR STRIP-ACNC: NORMAL EU/DL (ref 0–1)
VALPROATE SERPL-MCNC: 39 UG/ML (ref 50–125)
WBC OTHER # BLD: 14.1 K/UL (ref 3.5–11.3)

## 2025-03-29 PROCEDURE — 96365 THER/PROPH/DIAG IV INF INIT: CPT

## 2025-03-29 PROCEDURE — 85025 COMPLETE CBC W/AUTO DIFF WBC: CPT

## 2025-03-29 PROCEDURE — 6360000002 HC RX W HCPCS

## 2025-03-29 PROCEDURE — G0378 HOSPITAL OBSERVATION PER HR: HCPCS

## 2025-03-29 PROCEDURE — 81003 URINALYSIS AUTO W/O SCOPE: CPT

## 2025-03-29 PROCEDURE — 93005 ELECTROCARDIOGRAM TRACING: CPT

## 2025-03-29 PROCEDURE — 99285 EMERGENCY DEPT VISIT HI MDM: CPT

## 2025-03-29 PROCEDURE — 94761 N-INVAS EAR/PLS OXIMETRY MLT: CPT

## 2025-03-29 PROCEDURE — 80048 BASIC METABOLIC PNL TOTAL CA: CPT

## 2025-03-29 PROCEDURE — 80307 DRUG TEST PRSMV CHEM ANLYZR: CPT

## 2025-03-29 PROCEDURE — 2700000000 HC OXYGEN THERAPY PER DAY

## 2025-03-29 PROCEDURE — 96361 HYDRATE IV INFUSION ADD-ON: CPT

## 2025-03-29 PROCEDURE — 93005 ELECTROCARDIOGRAM TRACING: CPT | Performed by: EMERGENCY MEDICINE

## 2025-03-29 PROCEDURE — 96375 TX/PRO/DX INJ NEW DRUG ADDON: CPT

## 2025-03-29 PROCEDURE — 84443 ASSAY THYROID STIM HORMONE: CPT

## 2025-03-29 PROCEDURE — 96374 THER/PROPH/DIAG INJ IV PUSH: CPT

## 2025-03-29 PROCEDURE — 2580000003 HC RX 258: Performed by: EMERGENCY MEDICINE

## 2025-03-29 PROCEDURE — 96372 THER/PROPH/DIAG INJ SC/IM: CPT

## 2025-03-29 PROCEDURE — 2500000003 HC RX 250 WO HCPCS: Performed by: EMERGENCY MEDICINE

## 2025-03-29 PROCEDURE — 2500000003 HC RX 250 WO HCPCS

## 2025-03-29 PROCEDURE — 6360000002 HC RX W HCPCS: Performed by: EMERGENCY MEDICINE

## 2025-03-29 PROCEDURE — 99222 1ST HOSP IP/OBS MODERATE 55: CPT

## 2025-03-29 PROCEDURE — 80164 ASSAY DIPROPYLACETIC ACD TOT: CPT

## 2025-03-29 PROCEDURE — 6370000000 HC RX 637 (ALT 250 FOR IP)

## 2025-03-29 RX ORDER — SODIUM CHLORIDE 9 MG/ML
INJECTION, SOLUTION INTRAVENOUS PRN
Status: DISCONTINUED | OUTPATIENT
Start: 2025-03-29 | End: 2025-03-31 | Stop reason: HOSPADM

## 2025-03-29 RX ORDER — AMLODIPINE BESYLATE 5 MG/1
5 TABLET ORAL DAILY
Status: DISCONTINUED | OUTPATIENT
Start: 2025-03-29 | End: 2025-03-31 | Stop reason: HOSPADM

## 2025-03-29 RX ORDER — DIVALPROEX SODIUM 500 MG/1
1000 TABLET, FILM COATED, EXTENDED RELEASE ORAL DAILY
Status: DISCONTINUED | OUTPATIENT
Start: 2025-03-30 | End: 2025-03-30

## 2025-03-29 RX ORDER — ENOXAPARIN SODIUM 100 MG/ML
30 INJECTION SUBCUTANEOUS 2 TIMES DAILY
Status: DISCONTINUED | OUTPATIENT
Start: 2025-03-29 | End: 2025-03-31 | Stop reason: HOSPADM

## 2025-03-29 RX ORDER — ACETAMINOPHEN 325 MG/1
650 TABLET ORAL EVERY 6 HOURS PRN
Status: DISCONTINUED | OUTPATIENT
Start: 2025-03-29 | End: 2025-03-31 | Stop reason: HOSPADM

## 2025-03-29 RX ORDER — SODIUM CHLORIDE 0.9 % (FLUSH) 0.9 %
5-40 SYRINGE (ML) INJECTION PRN
Status: DISCONTINUED | OUTPATIENT
Start: 2025-03-29 | End: 2025-03-31 | Stop reason: HOSPADM

## 2025-03-29 RX ORDER — ACETAMINOPHEN 650 MG/1
650 SUPPOSITORY RECTAL EVERY 6 HOURS PRN
Status: DISCONTINUED | OUTPATIENT
Start: 2025-03-29 | End: 2025-03-31 | Stop reason: HOSPADM

## 2025-03-29 RX ORDER — DILTIAZEM HYDROCHLORIDE 5 MG/ML
10 INJECTION INTRAVENOUS ONCE
Status: COMPLETED | OUTPATIENT
Start: 2025-03-29 | End: 2025-03-29

## 2025-03-29 RX ORDER — MAGNESIUM SULFATE IN WATER 40 MG/ML
2000 INJECTION, SOLUTION INTRAVENOUS PRN
Status: DISCONTINUED | OUTPATIENT
Start: 2025-03-29 | End: 2025-03-31 | Stop reason: HOSPADM

## 2025-03-29 RX ORDER — 0.9 % SODIUM CHLORIDE 0.9 %
1000 INTRAVENOUS SOLUTION INTRAVENOUS ONCE
Status: COMPLETED | OUTPATIENT
Start: 2025-03-29 | End: 2025-03-29

## 2025-03-29 RX ORDER — ONDANSETRON 2 MG/ML
4 INJECTION INTRAMUSCULAR; INTRAVENOUS EVERY 6 HOURS PRN
Status: DISCONTINUED | OUTPATIENT
Start: 2025-03-29 | End: 2025-03-31 | Stop reason: HOSPADM

## 2025-03-29 RX ORDER — ONDANSETRON 2 MG/ML
4 INJECTION INTRAMUSCULAR; INTRAVENOUS ONCE
Status: COMPLETED | OUTPATIENT
Start: 2025-03-29 | End: 2025-03-29

## 2025-03-29 RX ORDER — POTASSIUM CHLORIDE 7.45 MG/ML
10 INJECTION INTRAVENOUS PRN
Status: DISCONTINUED | OUTPATIENT
Start: 2025-03-29 | End: 2025-03-31 | Stop reason: HOSPADM

## 2025-03-29 RX ORDER — ONDANSETRON 4 MG/1
4 TABLET, ORALLY DISINTEGRATING ORAL EVERY 8 HOURS PRN
Status: DISCONTINUED | OUTPATIENT
Start: 2025-03-29 | End: 2025-03-31 | Stop reason: HOSPADM

## 2025-03-29 RX ORDER — SODIUM CHLORIDE 0.9 % (FLUSH) 0.9 %
5-40 SYRINGE (ML) INJECTION EVERY 12 HOURS SCHEDULED
Status: DISCONTINUED | OUTPATIENT
Start: 2025-03-29 | End: 2025-03-31 | Stop reason: HOSPADM

## 2025-03-29 RX ORDER — POTASSIUM CHLORIDE 1500 MG/1
40 TABLET, EXTENDED RELEASE ORAL PRN
Status: DISCONTINUED | OUTPATIENT
Start: 2025-03-29 | End: 2025-03-31 | Stop reason: HOSPADM

## 2025-03-29 RX ORDER — LORAZEPAM 2 MG/ML
2 INJECTION INTRAMUSCULAR ONCE
Status: COMPLETED | OUTPATIENT
Start: 2025-03-29 | End: 2025-03-29

## 2025-03-29 RX ORDER — SERTRALINE HYDROCHLORIDE 25 MG/1
25 TABLET, FILM COATED ORAL DAILY
Status: DISCONTINUED | OUTPATIENT
Start: 2025-03-29 | End: 2025-03-31 | Stop reason: HOSPADM

## 2025-03-29 RX ORDER — POLYETHYLENE GLYCOL 3350 17 G/17G
17 POWDER, FOR SOLUTION ORAL DAILY PRN
Status: DISCONTINUED | OUTPATIENT
Start: 2025-03-29 | End: 2025-03-31 | Stop reason: HOSPADM

## 2025-03-29 RX ORDER — LORAZEPAM 2 MG/ML
4 INJECTION INTRAMUSCULAR EVERY 5 MIN PRN
Status: DISCONTINUED | OUTPATIENT
Start: 2025-03-29 | End: 2025-03-31 | Stop reason: HOSPADM

## 2025-03-29 RX ORDER — LORAZEPAM 2 MG/ML
INJECTION INTRAMUSCULAR
Status: COMPLETED
Start: 2025-03-29 | End: 2025-03-29

## 2025-03-29 RX ORDER — DILTIAZEM HYDROCHLORIDE 5 MG/ML
10 INJECTION INTRAVENOUS ONCE
Status: DISCONTINUED | OUTPATIENT
Start: 2025-03-29 | End: 2025-03-31 | Stop reason: HOSPADM

## 2025-03-29 RX ADMIN — DILTIAZEM HYDROCHLORIDE 10 MG: 5 INJECTION, SOLUTION INTRAVENOUS at 15:36

## 2025-03-29 RX ADMIN — ENOXAPARIN SODIUM 30 MG: 100 INJECTION SUBCUTANEOUS at 21:47

## 2025-03-29 RX ADMIN — SODIUM CHLORIDE, PRESERVATIVE FREE 10 ML: 5 INJECTION INTRAVENOUS at 21:48

## 2025-03-29 RX ADMIN — LORAZEPAM: 2 INJECTION INTRAMUSCULAR at 17:00

## 2025-03-29 RX ADMIN — LORAZEPAM: 2 INJECTION INTRAMUSCULAR; INTRAVENOUS at 17:00

## 2025-03-29 RX ADMIN — AMLODIPINE BESYLATE 5 MG: 5 TABLET ORAL at 21:46

## 2025-03-29 RX ADMIN — ONDANSETRON 4 MG: 2 INJECTION, SOLUTION INTRAMUSCULAR; INTRAVENOUS at 19:28

## 2025-03-29 RX ADMIN — VALPROATE SODIUM 1000 MG: 100 INJECTION, SOLUTION INTRAVENOUS at 17:56

## 2025-03-29 RX ADMIN — SODIUM CHLORIDE 1000 ML: 0.9 INJECTION, SOLUTION INTRAVENOUS at 14:54

## 2025-03-29 RX ADMIN — SERTRALINE 25 MG: 25 TABLET, FILM COATED ORAL at 21:46

## 2025-03-29 ASSESSMENT — PAIN SCALES - GENERAL: PAINLEVEL_OUTOF10: 5

## 2025-03-29 ASSESSMENT — PAIN DESCRIPTION - ORIENTATION: ORIENTATION: LEFT;RIGHT

## 2025-03-29 ASSESSMENT — LIFESTYLE VARIABLES
HOW OFTEN DO YOU HAVE A DRINK CONTAINING ALCOHOL: NEVER
HOW MANY STANDARD DRINKS CONTAINING ALCOHOL DO YOU HAVE ON A TYPICAL DAY: PATIENT DOES NOT DRINK

## 2025-03-29 ASSESSMENT — PAIN DESCRIPTION - DESCRIPTORS: DESCRIPTORS: ACHING

## 2025-03-29 ASSESSMENT — PAIN DESCRIPTION - LOCATION: LOCATION: HEAD

## 2025-03-29 ASSESSMENT — PAIN DESCRIPTION - PAIN TYPE: TYPE: ACUTE PAIN

## 2025-03-29 ASSESSMENT — PAIN - FUNCTIONAL ASSESSMENT: PAIN_FUNCTIONAL_ASSESSMENT: 0-10

## 2025-03-29 NOTE — H&P
[facility]  Office: [phone]  Jose Gupta DO, Guille Ruiz DO, Jorge De Souza DO, Randell Arriaga DO, Jean Paul Murillo MD, Linda Rivers MD, Susan Parra MD, Krystin Tubbs MD,  Carlo Salter MD, Marcus Lewis MD, Bryant Olmstead MD,  Uvaldo Massey DO, Juanita Adrian MD, Caden Dwyer MD, Dao Gupta DO, Lizeth Payton MD,  Edy Maharaj DO, Alice Miller MD, Caryn Helm MD, Howard Wolfe MD,  Bird Bridges MD, Ruthy Bryson MD, Isabelle Ornelas MD, Jordi Lan MD, Yehuda Walker MD, Fabricio Gomez MD, Toby Mon DO, Josue Romero MD, Uvaldo Reyna MD, Mohsin Reza, MD, Lexa Moses MD, Shirley Waterhouse, CNP,  Gudelia Nick CNP, Toby Dempsey, CNP,  Trudy Hale, DNP, Maryanne Mccoy, CNP, Shobha Lynch, CNP, Dominique Franklin, CNP, Nina Landin, CNP, CHUCK TangC, Olena Craven, CNP, Chelsey Pacheco, CNP,  Brenda Flynn, CNP, Madie Marcelino, CNP, Eliana Ames, CNP,  Airam Velázquez, CNS, Ariane Kaiser CNP, Cheyenne Monson CNP,   Jaida Osborne, CNP         [facility]   IN-PATIENT SERVICE   [facility]    HISTORY AND PHYSICAL EXAMINATION            Date:   3/29/2025  Patient name:  Kane Mijares  Date of admission:  3/29/2025  2:33 PM  MRN:   [id number]  Account:  [id number]  YOB: 1995  PCP:    No primary care provider on file.  Room:   [place] [address]  Code Status:    Prior    Chief Complaint:     Chief Complaint   Patient presents with    Seizures     Fall- father witnessed- 'less than 5 minutes'; last one was Dec. Saw neuro last week- changes to meds. 30 minutes post ictal     History Obtained From:     patient, electronic medical record    History of Present Illness:     Kane Mijares is a 30 y.o.  /  male who presents with Seizures (Fall- father witnessed- 'less than 5 minutes'; last one was Dec. Saw neuro last week- changes to meds. 30 minutes post ictal)   and is admitted to the hospital for the

## 2025-03-29 NOTE — ED NOTES
Charge nurse at bedside tocheck patients pulse manually, Patients pulse is 120  
Post seizure writer at beside, Pt is agitated pulling at cords, Patient is confused, Pt on cardiac monitor, patient on 2 liters of 02.   
Pt had a witness seizure that last 60 seconds. Mom states before seizure patient got a chill sensation up his back. Writer and MD E.Goldberger at bedside, Suction set up at bedside. Pt bit tongue, Blood noted to patients mouth.   
Pt presents to ER from home due to seizures. Pt has hx of seizures and recently seen at PCP office to have seizure meds adjusted. Pt family states he was sleeping when he had a five minute seizure. Pt states he is complaint with his medication.Pt states headache. Pt denies chest pain or SOB. Pt is A/O x 4, equal chest expansion with non labored breathing, wheels locked, bed in lowest position, call light in reach. Patient placed on 2 liters.   
Turned Pt's O2 NC to 4L. O2 saturation at 95%  
Writer attempted to call report.  
Writer notified E.Goldberger regarding patients HR   
Writer placed pure wick, brief and adjusted patient in bed.   
above    Background  History:   Past Medical History:   Diagnosis Date    Dog bite 11/2014    Wrist injury     injuried growth plate lt wrist       Medication Review:  [] Via patient  [] From medication list  [] Pharmacy Med Rec  [x] Unable to assess based on patient condition  [] Rn not able to complete      Assessment    Vitals/MEWS: MEWS Score: 1  Level of Consciousness: Alert (0)   Vitals:    03/29/25 1925 03/29/25 1930 03/29/25 1933 03/29/25 1940   BP:  (!) 153/88     Pulse: (!) 120 (!) 101  68   Resp:  (!) 34  18   Temp:       TempSrc:       SpO2:  96% 95% 96%   Weight:       Height:         FiO2 (%): desaturation  O2 Flow Rate: O2 Device: Nasal cannula O2 Flow Rate (L/min): 4 L/min  Cardiac Rhythm:    Pain Assessment:  [x] Verbal [] Salter Quiroz Scale  Pain Scale: Pain Assessment  Pain Assessment: 0-10  Pain Level: 5  Pain Location: Head  Pain Orientation: Left, Right  Pain Descriptors: Aching  Pain Type: Acute pain  Last documented pain score (0-10 scale) Pain Level: 5  Last documented pain medication administered: N/A  Mental Status: oriented  Orientation Level:    NIH Score:    C-SSRS: Risk of Suicide: No Risk  Bedside swallow:    [place] Coma Scale (GCS): Yoandy Coma Scale  Eye Opening: Spontaneous  Best Verbal Response: Confused  Best Motor Response: Obeys commands  Yo[place] Coma Scale Score: 15  Active LDA's:   Peripheral IV 03/29/25 Left Antecubital (Active)                 PO Status: Regular  Pertinent or High Risk Medications/Drips: no   If Yes, please provide details:   Pending Blood Product Administration: no       You may also review the ED PT Care Timeline found under the Summary Nursing Index tab.    Recommendation    Pending orders   Plan for Discharge (if known):   Additional Comments:    If any further questions, please call Sending RN at [phone]    [] Medication Reconciliation was completed and the patient's home medication list was verified. The Med List Status is \"Complete\". The following

## 2025-03-29 NOTE — ED PROVIDER NOTES
EMERGENCY DEPARTMENT ENCOUNTER    Pt Name: Kane Mijares  MRN: [id number]  Birthdate 1995  Date of evaluation: 3/29/25  CHIEF COMPLAINT       Chief Complaint   Patient presents with    Seizures     Fall- father witnessed- 'less than 5 minutes'; last one was Dec. Saw neuro last week- changes to meds. 30 minutes post ictal     HISTORY OF PRESENT ILLNESS   30-year-old male presents emergency room after a seizure this afternoon.  Patient was laying down on the couch asleep when he had a seizure episode.  He did fall off the couch.  Patient has history of seizure disorder.  He follows with neurology and is taking Depakote 500 mg twice a day.  Patient reports compliance with medication.  No recent illnesses.  Patient denies any regular alcohol use.             REVIEW OF SYSTEMS     Review of Systems   Neurological:  Positive for seizures.     PASTMEDICAL HISTORY     Past Medical History:   Diagnosis Date    Dog bite 11/2014    Wrist injury     injuried growth plate lt wrist     Past Problem List  Patient Active Problem List   Diagnosis Code    Anxiety F41.9    Unspecified mood (affective) disorder F39    Seizure-like activity (HCC) R56.9    Seizure (HCC) R56.9    Leukocytosis D72.829    Primary hypertension I10    Severe obesity (BMI >= 40) E66.01    Spell of altered consciousness R40.4    Breakthrough seizure (HCC) G40.919    Non compliance w medication regimen Z91.148     SURGICAL HISTORY       Past Surgical History:   Procedure Laterality Date    OTHER SURGICAL HISTORY Left 11/28/2014    I & D elbow s/p dog bite     CURRENT MEDICATIONS       Previous Medications    AMLODIPINE (NORVASC) 5 MG TABLET    Take 1 tablet by mouth daily    DIVALPROEX (DEPAKOTE ER) 500 MG EXTENDED RELEASE TABLET    Take 2 tablets by mouth daily    SERTRALINE (ZOLOFT) 25 MG TABLET    Take 1 tablet by mouth daily     ALLERGIES     has no known allergies.  FAMILY HISTORY     He indicated that his mother is alive. He indicated that his father is

## 2025-03-30 PROBLEM — I48.0 PAROXYSMAL ATRIAL FIBRILLATION (HCC): Status: ACTIVE | Noted: 2025-03-30

## 2025-03-30 PROBLEM — R56.9 SEIZURE SECONDARY TO SUBTHERAPEUTIC ANTICONVULSANT MEDICATION (HCC): Status: ACTIVE | Noted: 2025-03-30

## 2025-03-30 PROBLEM — Z79.899 SEIZURE SECONDARY TO SUBTHERAPEUTIC ANTICONVULSANT MEDICATION (HCC): Status: ACTIVE | Noted: 2025-03-30

## 2025-03-30 LAB
BASOPHILS # BLD: 0.03 K/UL (ref 0–0.2)
BASOPHILS NFR BLD: 0 % (ref 0–2)
EOSINOPHIL # BLD: 0.04 K/UL (ref 0–0.44)
EOSINOPHILS RELATIVE PERCENT: 0 % (ref 1–4)
ERYTHROCYTE [DISTWIDTH] IN BLOOD BY AUTOMATED COUNT: 14.3 % (ref 11.8–14.4)
HCT VFR BLD AUTO: 44.1 % (ref 40.7–50.3)
HGB BLD-MCNC: 14.4 G/DL (ref 13–17)
IMM GRANULOCYTES # BLD AUTO: 0.05 K/UL (ref 0–0.3)
IMM GRANULOCYTES NFR BLD: 0 %
LYMPHOCYTES NFR BLD: 2.04 K/UL (ref 1.1–3.7)
LYMPHOCYTES RELATIVE PERCENT: 15 % (ref 24–43)
MCH RBC QN AUTO: 31.4 PG (ref 25.2–33.5)
MCHC RBC AUTO-ENTMCNC: 32.7 G/DL (ref 28.4–34.8)
MCV RBC AUTO: 96.3 FL (ref 82.6–102.9)
MONOCYTES NFR BLD: 1.38 K/UL (ref 0.1–1.2)
MONOCYTES NFR BLD: 10 % (ref 3–12)
NEUTROPHILS NFR BLD: 75 % (ref 36–65)
NEUTS SEG NFR BLD: 10.11 K/UL (ref 1.5–8.1)
NRBC BLD-RTO: 0 PER 100 WBC
PLATELET # BLD AUTO: 270 K/UL (ref 138–453)
PMV BLD AUTO: 9.6 FL (ref 8.1–13.5)
RBC # BLD AUTO: 4.58 M/UL (ref 4.21–5.77)
WBC OTHER # BLD: 13.7 K/UL (ref 3.5–11.3)

## 2025-03-30 PROCEDURE — 6370000000 HC RX 637 (ALT 250 FOR IP): Performed by: STUDENT IN AN ORGANIZED HEALTH CARE EDUCATION/TRAINING PROGRAM

## 2025-03-30 PROCEDURE — 6370000000 HC RX 637 (ALT 250 FOR IP): Performed by: NURSE PRACTITIONER

## 2025-03-30 PROCEDURE — 6370000000 HC RX 637 (ALT 250 FOR IP)

## 2025-03-30 PROCEDURE — 2500000003 HC RX 250 WO HCPCS

## 2025-03-30 PROCEDURE — 99232 SBSQ HOSP IP/OBS MODERATE 35: CPT | Performed by: INTERNAL MEDICINE

## 2025-03-30 PROCEDURE — 36415 COLL VENOUS BLD VENIPUNCTURE: CPT

## 2025-03-30 PROCEDURE — G0378 HOSPITAL OBSERVATION PER HR: HCPCS

## 2025-03-30 PROCEDURE — 85025 COMPLETE CBC W/AUTO DIFF WBC: CPT

## 2025-03-30 PROCEDURE — 99222 1ST HOSP IP/OBS MODERATE 55: CPT | Performed by: STUDENT IN AN ORGANIZED HEALTH CARE EDUCATION/TRAINING PROGRAM

## 2025-03-30 RX ORDER — HYDROXYZINE HYDROCHLORIDE 25 MG/1
25 TABLET, FILM COATED ORAL 3 TIMES DAILY PRN
COMMUNITY

## 2025-03-30 RX ORDER — ASPIRIN 81 MG/1
81 TABLET ORAL DAILY
Status: DISCONTINUED | OUTPATIENT
Start: 2025-03-30 | End: 2025-03-31 | Stop reason: HOSPADM

## 2025-03-30 RX ORDER — DIVALPROEX SODIUM 500 MG/1
1000 TABLET, FILM COATED, EXTENDED RELEASE ORAL 2 TIMES DAILY
Status: DISCONTINUED | OUTPATIENT
Start: 2025-03-30 | End: 2025-03-31 | Stop reason: HOSPADM

## 2025-03-30 RX ADMIN — AMLODIPINE BESYLATE 5 MG: 5 TABLET ORAL at 08:18

## 2025-03-30 RX ADMIN — DIVALPROEX SODIUM 1000 MG: 500 TABLET, EXTENDED RELEASE ORAL at 08:18

## 2025-03-30 RX ADMIN — DIVALPROEX SODIUM 1000 MG: 500 TABLET, EXTENDED RELEASE ORAL at 19:34

## 2025-03-30 RX ADMIN — SERTRALINE 25 MG: 25 TABLET, FILM COATED ORAL at 08:18

## 2025-03-30 RX ADMIN — ASPIRIN 81 MG: 81 TABLET, COATED ORAL at 08:23

## 2025-03-30 RX ADMIN — SODIUM CHLORIDE, PRESERVATIVE FREE 10 ML: 5 INJECTION INTRAVENOUS at 08:23

## 2025-03-30 ASSESSMENT — PAIN SCALES - GENERAL: PAINLEVEL_OUTOF10: 0

## 2025-03-30 NOTE — PROGRESS NOTES
[facility]  Office: [phone]  Jose Gupta DO, Guille Ruiz DO, Jorge De Souza DO, Randell Arriaga DO, Jean Paul Murillo MD, Linda Rivers MD, Susan Parra MD, Krystin Tubbs MD,  Carlo Salter MD, Marcus Lewis MD, Bryant Olmstead MD,  Uvaldo Massey DO, Juanita Adrian MD, Caden Dwyer MD, Dao Gupta DO, Lizeth Payton MD,  Edy Maharaj DO, Alice Miller MD, Caryn Helm MD, Howard Wolfe MD,  Bird Bridges MD, Ruthy Bryson MD, Isabelle Ornelas MD, Jordi Lan MD, Yehuda Walker MD, Fabricio Gomez MD, Toby Mon DO, Josue Romero MD, Uvaldo Reyna MD, Mohsin Reza, MD, Lexa Moses MD, Shirley Waterhouse, CNP,  Gudelia Nick CNP, Toby Dempsey, CNP,  Trudy Hale, DNP, Maryanne Mccoy, CNP, Shobha Lynch, CNP, Dominique Franklin, CNP, Nina Landin, CNP, Nano Neff, PA-C, Olena Craven, CNP, Chelsey Pacheco, CNP,  Brenda Flynn, CNP, Madie Marcelino, CNP, Eliana Ames, CNP,  Airam Velázquez, CNS, Ariane Kaiser CNP, Cheyenne Monson CNP,   Jaida Osborne, CNP         [facility]   IN-PATIENT SERVICE   [facility]    Progress Note    3/30/2025    1:29 PM    Name:   Kane Mijares  MRN:     [id number]     Acct:      [id number]   Room:   1012/1012-02   Day:  0  Admit Date:  3/29/2025  2:33 PM    PCP:   No primary care provider on file.  Code Status:  Full Code    Subjective:     C/C:   Chief Complaint   Patient presents with    Seizures     Fall- father witnessed- 'less than 5 minutes'; last one was Dec. Saw neuro last week- changes to meds. 30 minutes post ictal     Interval History Status: .   Not having seizure at present  Patient has been evaluated by neurology and dose of Depakote has been increased  Lovenox held due to seizures and EPC cuffs will be placed  Brief History:     Kane Mijares is a 30 y.o.  /  male who presents with Seizures (Fall- father witnessed- 'less than 5 minutes'; last one was Dec. Saw neuro last week-

## 2025-03-30 NOTE — PLAN OF CARE
Problem: Discharge Planning  Goal: Discharge to home or other facility with appropriate resources  3/30/2025 1244 by Adry Sen, RN  Outcome: Progressing  Flowsheets (Taken 3/30/2025 0815)  Discharge to home or other facility with appropriate resources: Identify barriers to discharge with patient and caregiver   Monitoring  Problem: Safety - Adult  Goal: Free from fall injury  3/30/2025 1244 by Adry Sen, RN  Outcome: Progressing  Flowsheets (Taken 3/30/2025 0858)  Free From Fall Injury: Instruct family/caregiver on patient safety   No injury noted this shift.   Problem: Pain  Goal: Verbalizes/displays adequate comfort level or baseline comfort level  Outcome: Progressing  Flowsheets (Taken 3/30/2025 0759)  Verbalizes/displays adequate comfort level or baseline comfort level: Encourage patient to monitor pain and request assistance   Pt denies pain this shift.   Problem: ABCDS Injury Assessment  Goal: Absence of physical injury  Outcome: Progressing   No injury noted this shift    Pt continues on seizure precautions. Denies any since last night in ER. Pt tolerating new dosing of depakote. To start additional dosing this evening.

## 2025-03-30 NOTE — PROGRESS NOTES
Pt states that per neurology, he can discharge. After reviewing notes, neurology did infact sign off. Pt however has cardiology on and is ordered an echocardiogram that can not be completed until 3/31. Pt is requesting to leave today.     Updated all information to primary, Dr. Murillo for evaluation.     Read. Awaiting response.     No new orders

## 2025-03-30 NOTE — PLAN OF CARE
Problem: Discharge Planning  Goal: Discharge to home or other facility with appropriate resources  3/30/2025 0105 by Ramo Najera, RN  Outcome: Progressing  Note: Discharge teaching and instructions for diagnosis/procedure explained with patient using teachback method.  Patient voiced understanding regarding prescriptions, follow up appointments, and care of self at home.   3/30/2025 0051 by Ramo Najera, RN  Outcome: Progressing     Problem: Safety - Adult  Goal: Free from fall injury  3/30/2025 0105 by Ramo Najera, RN  Outcome: Progressing  Note: Pt fall risk, fall band present, falling star, safety alarm activated and in use as needed. Hourly rounding performed. Pt encouraged to use call light. See Venkatesh fall risk assessment.  3/30/2025 0051 by Ramo Najera, RN  Outcome: Progressing

## 2025-03-30 NOTE — PROGRESS NOTES
Heart rate on monitor sinus bradycardia with 1.9 second pauses while lying on left side, then elevated to sinus tachy in 130s when lying on back, then sinus rhythm.  EKG = sinus rhythm with sinus arrhythmia, rate 94.  No seizure-like activity present.  PerfectServe message to Dr. Choe & updated.  No further orders received at this time, just to monitor patient.

## 2025-03-30 NOTE — CONSULTS
[facility] Neurology   IN-PATIENT SERVICE   [facility]    Inpatient Neurology consult Note            Date:   3/30/2025  Patient name:  Kane Mijares  Date of admission:  3/29/2025  2:33 PM  MRN:   [id number]  Account:  [id number]  YOB: 1995  PCP:    No primary care provider on file.  Room:   [address] [place] [address]  Code Status:    Full Code    Chief Complaint:     Chief Complaint   Patient presents with    Seizures     Fall- father witnessed- 'less than 5 minutes'; last one was Dec. Saw neuro last week- changes to meds. 30 minutes post ictal       History Obtained From:   patient, electronic medical record    History of Present Illness:   The patient is a 30 y.o.   /  male who presents to [facility] ER 3/29/25 with breakthrough seizure and prolong post ictal period. PMH significant for multiple episodes of seizure like activity with no formal epilepsy diagnosis and negative multiple workups by neurology including 2 routine EEG and 2 prolong video EEGs up to full 3 days, medication non compliance, and underlying mood disorder. Patient states that he was unsure what time he was supposed to be taking his Depakote and may have missed a few doses prior to ER arrival and seizure.  Regardless patient had generalized tonic-clonic seizure day of admission with left tongue bite and postictal confusion.  Currently patient back to baseline no focal neurologic deficits doing well.      Radiology Review and Neurologic History:   -patient following with Dr. Martinez of our neurology group last office visit 3/17/2025 for repeat seizure episodes. As per his documentation   \"Kane Mijares is a 29-year-old male with a questionable seizure history who presents to the clinic today, March 17, 2025, for follow-up after a recent hospitalization in November 2024. During that admission, he experienced breakthrough seizures, reportedly due to nonadherence to his antiseizure medications. He had been

## 2025-03-30 NOTE — PROGRESS NOTES
Olivia served Dr. Murillo of the following via perfect serve:  With patient having high risk for fall d/t recent seizure activity, do you want to continue lovenox sub q? Or would you like EPCs.    Awaiting response.     0918: Per Dr. Murillo, ok to place lovenox on hold d/t high risk for fall d/t recent seizure activity. Place order for EPCs.

## 2025-03-30 NOTE — PROGRESS NOTES
Patient admitted from ED via cart to Progressive Care Unit room 1012.  Patient arrived with external male catheter that was detached, incontinent or urine.  Patient transferred from cart to bed by x4 staff.  Patient cleaned, brief & external catheter changed.  Vital signs taken.  Placed on heart monitor. Admission database completed with patient's mother at bedside.  Placed on seizure precautions, suction at bedside, side rails x2 raised & padded.  Bed alarm activated for patient safety.  Alert & oriented x4.  No current seizure-like activity observed.

## 2025-03-30 NOTE — CONSULTS
Reason for Consult: Paroxysmal atrial fibrillation  Requesting Physician: Jean Paul Murillo MD    CHIEF COMPLAINT: Seizure    History Obtained From:  patient, electronic medical record    HISTORY OF PRESENT ILLNESS:      The patient is a 30 y.o. male with significant past medical history of seizures and hypertension and no known prior cardiac history that does not follow with a cardiologist who presents with seizure-like activity.  The patient reportedly is on antiseizure medication and last seizure prior to this admission was in December 2024.  On admission to the ER he was found to have atrial fibrillation with moderate ventricular response and was given Cardizem with some improvement but persisted requiring a drip for short time and has since converted to sinus rhythm with mild, intermittent sinus bradycardia.  The patient denies having any associated palpitations.  He reports that he has anxiety prior to his seizures with some palpitations but normally does not have palpitations.  He denies having any chest pain or shortness of breath and is active and can walk at least 2 blocks without having any cardiac symptoms.    Past Medical History: Seizures and hypertension  Past Medical History:   Diagnosis Date    Dog bite 11/2014    Wrist injury     injuried growth plate lt wrist     Past Surgical History:    Past Surgical History:   Procedure Laterality Date    OTHER SURGICAL HISTORY Left 11/28/2014    I & D elbow s/p dog bite     Home Medications:  Prior to Admission medications    Medication Sig Start Date End Date Taking? Authorizing Provider   sertraline (ZOLOFT) 25 MG tablet Take 1 tablet by mouth daily 3/17/25   Edgar Martinez DO   divalproex (DEPAKOTE ER) 500 MG extended release tablet Take 2 tablets by mouth daily 3/17/25 6/15/25  Edgar Martinez DO   amLODIPine (NORVASC) 5 MG tablet Take 1 tablet by mouth daily 12/9/24   Randell Arriaga DO     Current Medications:    Current Facility-Administered

## 2025-03-30 NOTE — PROGRESS NOTES
Patient incontinent of urine in bed, after pulling off male external catheter.  Patient cleaned & brief changed & provided urinal at bedside.  Patient incontinent of urine again, not using urinal.  Brief changed again.  Patient has remained in sinus rhythm & arrhythmia on monitor, and remains alert & oriented upon approach.  No episodes of seizure-like activity throughout the shift.  Side rails x2 remain padded & elevated for patient safety.  Bed alarm activated.

## 2025-03-31 ENCOUNTER — APPOINTMENT (OUTPATIENT)
Age: 30
End: 2025-03-31

## 2025-03-31 VITALS
HEIGHT: 66 IN | SYSTOLIC BLOOD PRESSURE: 151 MMHG | RESPIRATION RATE: 18 BRPM | DIASTOLIC BLOOD PRESSURE: 90 MMHG | BODY MASS INDEX: 40.34 KG/M2 | TEMPERATURE: 98.6 F | WEIGHT: 251 LBS | OXYGEN SATURATION: 93 % | HEART RATE: 65 BPM

## 2025-03-31 LAB
ECHO AO ROOT DIAM: 3.1 CM
ECHO AO ROOT INDEX: 1.41 CM/M2
ECHO AV MEAN GRADIENT: 2 MMHG
ECHO AV MEAN VELOCITY: 0.7 M/S
ECHO AV PEAK GRADIENT: 5 MMHG
ECHO AV PEAK VELOCITY: 1.1 M/S
ECHO AV VELOCITY RATIO: 1
ECHO AV VTI: 25.2 CM
ECHO BSA: 2.3 M2
ECHO EST RA PRESSURE: 3 MMHG
ECHO LA AREA 2C: 22.6 CM2
ECHO LA AREA 4C: 21.1 CM2
ECHO LA DIAMETER INDEX: 1.68 CM/M2
ECHO LA DIAMETER: 3.7 CM
ECHO LA MAJOR AXIS: 6.1 CM
ECHO LA MINOR AXIS: 6.3 CM
ECHO LA TO AORTIC ROOT RATIO: 1.19
ECHO LA VOL BP: 63 ML (ref 18–58)
ECHO LA VOL MOD A2C: 65 ML (ref 18–58)
ECHO LA VOL MOD A4C: 58 ML (ref 18–58)
ECHO LA VOL/BSA BIPLANE: 29 ML/M2 (ref 16–34)
ECHO LA VOLUME INDEX MOD A2C: 30 ML/M2 (ref 16–34)
ECHO LA VOLUME INDEX MOD A4C: 26 ML/M2 (ref 16–34)
ECHO LV E' LATERAL VELOCITY: 10.1 CM/S
ECHO LV E' SEPTAL VELOCITY: 8.05 CM/S
ECHO LV EF PHYSICIAN: 60 %
ECHO LV FRACTIONAL SHORTENING: 41 % (ref 28–44)
ECHO LV INTERNAL DIMENSION DIASTOLE INDEX: 2.68 CM/M2
ECHO LV INTERNAL DIMENSION DIASTOLIC: 5.9 CM (ref 4.2–5.9)
ECHO LV INTERNAL DIMENSION SYSTOLIC INDEX: 1.59 CM/M2
ECHO LV INTERNAL DIMENSION SYSTOLIC: 3.5 CM
ECHO LV IVSD: 0.9 CM (ref 0.6–1)
ECHO LV MASS 2D: 224.6 G (ref 88–224)
ECHO LV MASS INDEX 2D: 102.1 G/M2 (ref 49–115)
ECHO LV POSTERIOR WALL DIASTOLIC: 1 CM (ref 0.6–1)
ECHO LV RELATIVE WALL THICKNESS RATIO: 0.34
ECHO LVOT PEAK GRADIENT: 4 MMHG
ECHO LVOT PEAK VELOCITY: 1.1 M/S
ECHO MV A VELOCITY: 1.01 M/S
ECHO MV E DECELERATION TIME (DT): 201 MS
ECHO MV E VELOCITY: 0.68 M/S
ECHO MV E/A RATIO: 0.67
ECHO MV E/E' LATERAL: 6.73
ECHO MV E/E' RATIO (AVERAGED): 7.59
ECHO MV E/E' SEPTAL: 8.45
ECHO RIGHT VENTRICULAR SYSTOLIC PRESSURE (RVSP): 24 MMHG
ECHO TV REGURGITANT MAX VELOCITY: 2.31 M/S
ECHO TV REGURGITANT PEAK GRADIENT: 21 MMHG
EKG ATRIAL RATE: 46 BPM
EKG ATRIAL RATE: 82 BPM
EKG P AXIS: 0 DEGREES
EKG P AXIS: 33 DEGREES
EKG P-R INTERVAL: 148 MS
EKG P-R INTERVAL: 148 MS
EKG Q-T INTERVAL: 328 MS
EKG Q-T INTERVAL: 340 MS
EKG Q-T INTERVAL: 386 MS
EKG Q-T INTERVAL: 424 MS
EKG QRS DURATION: 102 MS
EKG QRS DURATION: 112 MS
EKG QRS DURATION: 114 MS
EKG QRS DURATION: 92 MS
EKG QTC CALCULATION (BAZETT): 371 MS
EKG QTC CALCULATION (BAZETT): 446 MS
EKG QTC CALCULATION (BAZETT): 450 MS
EKG QTC CALCULATION (BAZETT): 472 MS
EKG R AXIS: -14 DEGREES
EKG R AXIS: -15 DEGREES
EKG R AXIS: -20 DEGREES
EKG R AXIS: -23 DEGREES
EKG T AXIS: 16 DEGREES
EKG T AXIS: 2 DEGREES
EKG T AXIS: 26 DEGREES
EKG T AXIS: 6 DEGREES
EKG VENTRICULAR RATE: 111 BPM
EKG VENTRICULAR RATE: 116 BPM
EKG VENTRICULAR RATE: 46 BPM
EKG VENTRICULAR RATE: 82 BPM

## 2025-03-31 PROCEDURE — 93010 ELECTROCARDIOGRAM REPORT: CPT | Performed by: INTERNAL MEDICINE

## 2025-03-31 PROCEDURE — 6370000000 HC RX 637 (ALT 250 FOR IP): Performed by: STUDENT IN AN ORGANIZED HEALTH CARE EDUCATION/TRAINING PROGRAM

## 2025-03-31 PROCEDURE — G0378 HOSPITAL OBSERVATION PER HR: HCPCS

## 2025-03-31 PROCEDURE — 6370000000 HC RX 637 (ALT 250 FOR IP)

## 2025-03-31 PROCEDURE — 93306 TTE W/DOPPLER COMPLETE: CPT

## 2025-03-31 PROCEDURE — 6370000000 HC RX 637 (ALT 250 FOR IP): Performed by: NURSE PRACTITIONER

## 2025-03-31 PROCEDURE — 2500000003 HC RX 250 WO HCPCS

## 2025-03-31 PROCEDURE — 99232 SBSQ HOSP IP/OBS MODERATE 35: CPT | Performed by: INTERNAL MEDICINE

## 2025-03-31 PROCEDURE — 93306 TTE W/DOPPLER COMPLETE: CPT | Performed by: INTERNAL MEDICINE

## 2025-03-31 RX ORDER — DIVALPROEX SODIUM 500 MG/1
1000 TABLET, FILM COATED, EXTENDED RELEASE ORAL 2 TIMES DAILY
Qty: 30 TABLET | Refills: 3 | Status: SHIPPED | OUTPATIENT
Start: 2025-03-31

## 2025-03-31 RX ORDER — ASPIRIN 81 MG/1
81 TABLET ORAL DAILY
Qty: 30 TABLET | Refills: 3 | Status: SHIPPED | OUTPATIENT
Start: 2025-04-01

## 2025-03-31 RX ADMIN — ASPIRIN 81 MG: 81 TABLET, COATED ORAL at 08:45

## 2025-03-31 RX ADMIN — DIVALPROEX SODIUM 1000 MG: 500 TABLET, EXTENDED RELEASE ORAL at 08:45

## 2025-03-31 RX ADMIN — AMLODIPINE BESYLATE 5 MG: 5 TABLET ORAL at 08:45

## 2025-03-31 RX ADMIN — SODIUM CHLORIDE, PRESERVATIVE FREE 10 ML: 5 INJECTION INTRAVENOUS at 08:46

## 2025-03-31 RX ADMIN — SERTRALINE 25 MG: 25 TABLET, FILM COATED ORAL at 08:45

## 2025-03-31 NOTE — PROGRESS NOTES
[facility]  Office: [phone]  Jose Gupta DO, Guille Ruiz DO, Jorge De Souza DO, Randell Arriaga DO, Jean Paul Murillo MD, Linda Rivers MD, Susan Parra MD, Krystin Tubbs MD,  Carlo Salter MD, Marcus Lewis MD, Bryant Olmstead MD,  Uvaldo Massey DO, Juanita Adrian MD, Caden Dwyer MD, Dao Gupta DO, Lizeth Payton MD,  Edy Maharaj DO, Alice Miller MD, Caryn Helm MD, Howard Wolfe MD,  Bird Bridges MD, Ruthy Bryson MD, Isabelle Ornelas MD, Jordi Lan MD, Yehuda Walker MD, Fabricio Gomez MD, Toby Mon DO, Josue Romero MD, Uvaldo Reyna MD, Mohsin Reza, MD, Lexa Moses MD, Shirley Waterhouse, CNP,  Gudelia Nick CNP, Toby Dempsey, CNP,  Trudy Hale, DNP, Maryanne Mccoy, CNP, Shobha Lynch, CNP, Dominique Franklin, CNP, Nina Landin, CNP, Nano Neff, PA-C, Olena Craven, CNP, Chelsey Pacheco, CNP,  Brenda Flynn, CNP, Madie Marcelino, CNP, Eliana Ames, CNP,  Airam Velázquez, CNS, Ariane Kaiser CNP, Cheyenne Monson CNP,   Jaida Osborne, CNP         [facility]   IN-PATIENT SERVICE   [facility]    Progress Note    3/31/2025    12:21 PM    Name:   Kane Mijares  MRN:     [id number]     Acct:      [id number]   Room:   1012/1012-02   Day:  0  Admit Date:  3/29/2025  2:33 PM    PCP:   No primary care provider on file.  Code Status:  Full Code    Subjective:     C/C:   Chief Complaint   Patient presents with    Seizures     Fall- father witnessed- 'less than 5 minutes'; last one was Dec. Saw neuro last week- changes to meds. 30 minutes post ictal     Interval History Status: .   No seizures overnight  Patient has been evaluated by neurology and dose of Depakote has been increased  Lovenox held due to seizures and EPC cuffs were placed  Echocardiogram was ordered by cardiology due to a few episodes, it is being done today  Brief History:     Kane Mijares is a 30 y.o.  /  male who presents with Seizures (Fall-

## 2025-03-31 NOTE — PLAN OF CARE
Pt had an uneventful night. Depakote administered. Seizure precautions remains in place. No seizures overnight.   Pt NSR on monitor. Plan for echo today.   Discharge Plan: home   Problem: Safety - Adult  Goal: Free from fall injury  Outcome: Progressing     Problem: ABCDS Injury Assessment  Goal: Absence of physical injury  Outcome: Progressing     Problem: Discharge Planning  Goal: Discharge to home or other facility with appropriate resources  Outcome: Progressing

## 2025-03-31 NOTE — PLAN OF CARE
Aox4  RA  Ambulates STB  ECHO completed this am  Family at bedside throughout the day, plan of care reviewed and all questions answered  Bed alarm is on, bed locked and in the lowest position, call light within reach, and safety maintained    Problem: Discharge Planning  Goal: Discharge to home or other facility with appropriate resources  3/31/2025 0930 by Rossi Jain, RN  Outcome: Progressing     Problem: Safety - Adult  Goal: Free from fall injury  3/31/2025 0930 by Rossi Jain RN  Outcome: Progressing     Problem: Pain  Goal: Verbalizes/displays adequate comfort level or baseline comfort level  Outcome: Progressing     Problem: ABCDS Injury Assessment  Goal: Absence of physical injury  3/31/2025 0930 by Rossi Jain, RN  Outcome: Progressing

## 2025-03-31 NOTE — PLAN OF CARE
Pt discharged to home, left via father. Belongings gathered and taken with pt, IV and telemetry removed, discharge instruction given, pt verbalizes understanding. All questions and concerns addressed. Safety maintained.     Problem: Discharge Planning  Goal: Discharge to home or other facility with appropriate resources  3/31/2025 1658 by Rossi Jain, RN  Outcome: Completed

## 2025-03-31 NOTE — PROGRESS NOTES
Section of Cardiology  Progress Note      Date:  3/31/2025  Patient: Kane Mijares  Admission:  3/29/2025  2:33 PM  Admit DX: Breakthrough seizure (HCC) [G40.919]  Age:  30 y.o., 1995     LOS: 0 days     Reason for evaluation:   Atrial fibrillation      SUBJECTIVE:     No acute events overnight.  Telemetry since admission shows sinus rhythm with significant sinus arrhythmia and intermittent episodes of tachycardia    Patient currently denies chest pain, heart racing, palpitations      REVIEW OF SYMPTOMS:  ROS no change from admission H&P except for: See above      OBJECTIVE:      Current Inpatient Medications:   aspirin  81 mg Oral Daily    divalproex  1,000 mg Oral BID    dilTIAZem  10 mg IntraVENous Once    amLODIPine  5 mg Oral Daily    sertraline  25 mg Oral Daily    sodium chloride flush  5-40 mL IntraVENous 2 times per day    [Held by provider] enoxaparin  30 mg SubCUTAneous BID       IV Infusions (if any):   sodium chloride             EXAM:   Vitals:    VITALS:  BP (!) 151/90   Pulse 65   Temp 98.6 °F (37 °C) (Oral)   Resp 18   Ht 1.676 m (5' 6\")   Wt 113.9 kg (251 lb)   SpO2 93%   BMI 40.51 kg/m²   24HR INTAKE/OUTPUT:    Intake/Output Summary (Last 24 hours) at 3/31/2025 1609  Last data filed at 3/30/2025 1939  Gross per 24 hour   Intake 240 ml   Output 300 ml   Net -60 ml       General: Lying upright in bed in no acute distress, alert oriented x3  HEENT:  Anicteric, Fair dentition  Neck:  No JVD  Chest:  Clear to auscultation bilaterally  CV:  S2 normal, regular rate, no murmurs  Abdomen: Obese, soft, nontender, nondistended  Extremities:  2+ pulses, no edema  Neuro:  Grossly intact      Labs:   CBC:  Recent Labs     03/29/25  1508 03/30/25  0444   WBC 14.1* 13.7*   HGB 15.6 14.4   HCT 45.8 44.1    270     Magnesium:No results for input(s): \"MG\" in the last 72 hours.  BMP:  Recent Labs     03/29/25  1508      K 3.6*   CALCIUM 8.7   CO2 21   BUN 16   CREATININE 0.7   LABGLOM

## 2025-03-31 NOTE — DISCHARGE SUMMARY
GFR NOT REPORTED 11/27/2014 10:38 PM    CALCIUM 8.7 03/29/2025 03:08 PM     PT/INR:  No results found for: \"PTINR\", \"PROTIME\", \"INR\"  PTT: No results found for: \"APTT\"  FLP:  No results found for: \"CHOL\", \"TRIG\", \"HDL\"  U/A:    Lab Results   Component Value Date/Time    COLORU Yellow 03/29/2025 05:44 PM    TURBIDITY Clear 03/29/2025 05:44 PM    HGBUR NEGATIVE 03/29/2025 05:44 PM    PHUR 7.0 03/29/2025 05:44 PM    PROTEINU NEGATIVE 03/29/2025 05:44 PM    GLUCOSEU NEGATIVE 03/29/2025 05:44 PM    KETUA NEGATIVE 03/29/2025 05:44 PM    BILIRUBINUR NEGATIVE 03/29/2025 05:44 PM    UROBILINOGEN Normal 03/29/2025 05:44 PM    NITRU NEGATIVE 03/29/2025 05:44 PM    LEUKOCYTESUR NEGATIVE 03/29/2025 05:44 PM     TSH:    Lab Results   Component Value Date/Time    TSH 2.03 03/29/2025 03:08 PM        Radiology:  No results found.    Consultations:    Consults:     Final Specialist Recommendations/Findings:   IP CONSULT TO INTERNAL MEDICINE  IP CONSULT TO CARDIOLOGY  IP CONSULT TO NEUROLOGY      The patient was seen and examined on day of discharge and this discharge summary is in conjunction with any daily progress note from day of discharge.    Discharge plan:     Disposition: Home    Physician Follow Up:     Edgar Martinez DO  [address] [address] [address] [address]  [phone]    Schedule an appointment as soon as possible for a visit in 2 month(s)  Hospital follow-up for breakthrough seizure       Requiring Further Evaluation/Follow Up POST HOSPITALIZATION/Incidental Findings: Single episode of A-fib, follow-up with cardiology if Getting Episodes of Palpitation    Diet: cardiac diet    Activity: As tolerated    Instructions to Patient: Take seizure medications regularly as directed by neurology    Discharge Medications:      Medication List        START taking these medications      aspirin 81 MG EC tablet  Take 1 tablet by mouth daily  Start taking on: April 1, 2025            CHANGE how you take these medications

## 2025-03-31 NOTE — CARE COORDINATION
Discharge planning    Chart reviewed. Lives with father. Has no DME. No PCP but her prior SS notes patient will make an appt with [facility].      Admitted with seizure due to non compliance.  Neurology signed off. Patient likely discharge today after echo completed.   
Yes  Other Identified Issues/Barriers to RETURNING to current housing:pt and dad live together.  Potential Assistance needed at discharge: N/A            Potential DME:    Patient expects to discharge to: House  Plan for transportation at discharge: Family    Financial    Payor: PENDING MEDICAID / Plan: PENDING MEDICAID / Product Type: *No Product type* /     Does insurance require precert for SNF: Yes    Potential assistance Purchasing Medications: Yes  Meds-to-Beds request:        [place], [place] - [address] [facility] [phone] - F [phone]  [address] [facility] [place] [address]  Phone: [phone] Fax: [phone]      Notes:    Factors facilitating achievement of predicted outcomes: Caregiver support    Barriers to discharge: Decreased endurance    Additional Case Management Notes:Pt works and plans to return to work. Dad is supportive. Pt lives with him. Has no dme no needs at this time. Needs pcp!  Pt. Plans to make appointment at [facility] as soon as they are open tomorrow.  Has ride home.    The Plan for Transition of Care is related to the following treatment goals of Breakthrough seizure (HCC) [G40.919]    IF APPLICABLE: The Patient and/or patient representative Kane and his family were provided with a choice of provider and agrees with the discharge plan. Freedom of choice list with basic dialogue that supports the patient's individualized plan of care/goals and shares the quality data associated with the providers was provided to: Patient, Patient Representative   Patient Representative Name: dad     The Patient and/or Patient Representative Agree with the Discharge Plan? Yes    DEENA Lo, LSW  Case Management Department  Ph:  Fax:

## 2025-04-01 LAB
EKG ATRIAL RATE: 94 BPM
EKG P AXIS: 40 DEGREES
EKG P-R INTERVAL: 166 MS
EKG Q-T INTERVAL: 374 MS
EKG QRS DURATION: 110 MS
EKG QTC CALCULATION (BAZETT): 467 MS
EKG R AXIS: -9 DEGREES
EKG T AXIS: 30 DEGREES
EKG VENTRICULAR RATE: 94 BPM

## 2025-04-04 ENCOUNTER — HOSPITAL ENCOUNTER (OUTPATIENT)
Dept: NEUROLOGY | Age: 30
Discharge: HOME | End: 2025-04-04
Payer: MEDICAID

## 2025-04-04 DIAGNOSIS — G40.909 NONINTRACTABLE EPILEPSY WITHOUT STATUS EPILEPTICUS, UNSPECIFIED EPILEPSY TYPE (HCC): ICD-10-CM

## 2025-04-04 PROCEDURE — 95816 EEG AWAKE AND DROWSY: CPT

## 2025-04-04 NOTE — PROCEDURES
PROCEDURE NOTE  Date: 4/4/2025   Name: Kane Mijares  YOB: 1995    Procedures            Date: 4/4/2025  Referring physician: Dr. Martinez    Indication  Patient aged 30 y with seizures. EEG done to assess for epileptiform activity.    Introduction  This routine 25-minute EEG was recorded using the [employer] 10-20 System on a [employer] workstation at 256 samples/s. Automated spike and seizure detection algorithms were applied.    Description  During the maximal alert state, a well-regulated, symmetric, and reactive 8-9 Hz posterior dominant rhythm was seen. No consistent focal slowing or interhemispheric asymmetry was noted. Stage I and stage II sleep were observed. There were no interictal epileptiform discharges or electrographic seizures.    There was continuous monomorphic R temporal sharps however felt to be EKG artifact        Activations  Hyperventilation was not performed. Intermittent photic stimulation was performed and demonstrated no posterior driving response.    Impression  Inconclusive awake and sleep EEG. There was frequent but seem EKG timed phase reversal seen mainly over right temporal leads, this of unclear significance, felt to be artifactual but need clinical correlation and /or repeat EEG.         Uvaldo Burton MD  Epilepsy Board Certified.  Neurology Board Certified.    Electronically Signed

## 2025-04-25 ENCOUNTER — TELEPHONE (OUTPATIENT)
Dept: NEUROLOGY | Age: 30
End: 2025-04-25

## 2025-04-25 ENCOUNTER — HOSPITAL ENCOUNTER (EMERGENCY)
Age: 30
Discharge: HOME | End: 2025-04-25
Attending: EMERGENCY MEDICINE

## 2025-04-25 ENCOUNTER — APPOINTMENT (OUTPATIENT)
Dept: CT IMAGING | Age: 30
End: 2025-04-25

## 2025-04-25 VITALS
OXYGEN SATURATION: 94 % | SYSTOLIC BLOOD PRESSURE: 136 MMHG | HEART RATE: 77 BPM | BODY MASS INDEX: 40.35 KG/M2 | RESPIRATION RATE: 30 BRPM | DIASTOLIC BLOOD PRESSURE: 71 MMHG | WEIGHT: 250 LBS | TEMPERATURE: 97.5 F

## 2025-04-25 DIAGNOSIS — G40.919 BREAKTHROUGH SEIZURE (HCC): ICD-10-CM

## 2025-04-25 DIAGNOSIS — R56.9 SEIZURE (HCC): Primary | ICD-10-CM

## 2025-04-25 LAB
ALBUMIN SERPL-MCNC: 4.5 G/DL (ref 3.5–5.2)
ALBUMIN/GLOB SERPL: 1.3 {RATIO} (ref 1–2.5)
ALP SERPL-CCNC: 71 U/L (ref 40–129)
ALT SERPL-CCNC: 18 U/L (ref 10–50)
ANION GAP SERPL CALCULATED.3IONS-SCNC: 15 MMOL/L (ref 9–16)
AST SERPL-CCNC: 28 U/L (ref 10–50)
BASOPHILS # BLD: 0 K/UL (ref 0–0.2)
BASOPHILS # BLD: 0 K/UL (ref 0–0.2)
BASOPHILS NFR BLD: 0 %
BASOPHILS NFR BLD: 0 %
BILIRUB SERPL-MCNC: 0.2 MG/DL (ref 0–1.2)
BUN SERPL-MCNC: 15 MG/DL (ref 6–20)
CALCIUM SERPL-MCNC: 9.1 MG/DL (ref 8.6–10.4)
CHLORIDE SERPL-SCNC: 102 MMOL/L (ref 98–107)
CO2 SERPL-SCNC: 20 MMOL/L (ref 20–31)
CREAT SERPL-MCNC: 0.7 MG/DL (ref 0.7–1.2)
EOSINOPHIL # BLD: 0 K/UL (ref 0–0.4)
EOSINOPHIL # BLD: 0 K/UL (ref 0–0.4)
EOSINOPHILS RELATIVE PERCENT: 0 % (ref 1–4)
EOSINOPHILS RELATIVE PERCENT: 0 % (ref 1–4)
ERYTHROCYTE [DISTWIDTH] IN BLOOD BY AUTOMATED COUNT: 12 % (ref 11.8–14.4)
ERYTHROCYTE [DISTWIDTH] IN BLOOD BY AUTOMATED COUNT: 12 % (ref 11.8–14.4)
GFR, ESTIMATED: >90 ML/MIN/1.73M2
GLUCOSE SERPL-MCNC: 105 MG/DL (ref 74–99)
HCT VFR BLD AUTO: 43.6 % (ref 40.7–50.3)
HCT VFR BLD AUTO: 49.8 % (ref 40.7–50.3)
HGB BLD-MCNC: 14.9 G/DL (ref 13–17)
HGB BLD-MCNC: 17.2 G/DL (ref 13–17)
IMM GRANULOCYTES # BLD AUTO: 0 K/UL (ref 0–0.3)
IMM GRANULOCYTES # BLD AUTO: 0 K/UL (ref 0–0.3)
IMM GRANULOCYTES NFR BLD: 0 %
IMM GRANULOCYTES NFR BLD: 0 %
LACTATE BLDV-SCNC: 1.5 MMOL/L (ref 0.5–2.2)
LYMPHOCYTES NFR BLD: 0.57 K/UL (ref 1–4.8)
LYMPHOCYTES NFR BLD: 1.75 K/UL (ref 1–4.8)
LYMPHOCYTES RELATIVE PERCENT: 3 % (ref 24–44)
LYMPHOCYTES RELATIVE PERCENT: 8 % (ref 24–44)
MAGNESIUM SERPL-MCNC: 2.2 MG/DL (ref 1.6–2.6)
MCH RBC QN AUTO: 31.9 PG (ref 25.2–33.5)
MCH RBC QN AUTO: 32 PG (ref 25.2–33.5)
MCHC RBC AUTO-ENTMCNC: 34.2 G/DL (ref 28.4–34.8)
MCHC RBC AUTO-ENTMCNC: 34.5 G/DL (ref 28.4–34.8)
MCV RBC AUTO: 92.4 FL (ref 82.6–102.9)
MCV RBC AUTO: 93.6 FL (ref 82.6–102.9)
MONOCYTES NFR BLD: 1.53 K/UL (ref 0.2–0.8)
MONOCYTES NFR BLD: 1.97 K/UL (ref 0.2–0.8)
MONOCYTES NFR BLD: 8 % (ref 1–7)
MONOCYTES NFR BLD: 9 % (ref 1–7)
MORPHOLOGY: ABNORMAL
NEUTROPHILS NFR BLD: 83 % (ref 36–66)
NEUTROPHILS NFR BLD: 89 % (ref 36–66)
NEUTS SEG NFR BLD: 17 K/UL (ref 1.8–7.7)
NEUTS SEG NFR BLD: 18.18 K/UL (ref 1.8–7.7)
NRBC BLD-RTO: 0 PER 100 WBC
NRBC BLD-RTO: 0 PER 100 WBC
PLATELET # BLD AUTO: 237 K/UL (ref 138–453)
PLATELET # BLD AUTO: 259 K/UL (ref 138–453)
PMV BLD AUTO: 8.9 FL (ref 8.1–13.5)
PMV BLD AUTO: 9 FL (ref 8.1–13.5)
POTASSIUM SERPL-SCNC: 4.2 MMOL/L (ref 3.7–5.3)
PROT SERPL-MCNC: 7.8 G/DL (ref 6.6–8.7)
RBC # BLD AUTO: 4.66 M/UL (ref 4.21–5.77)
RBC # BLD AUTO: 5.39 M/UL (ref 4.21–5.77)
SODIUM SERPL-SCNC: 137 MMOL/L (ref 136–145)
VALPROATE SERPL-MCNC: 107 UG/ML (ref 50–125)
WBC OTHER # BLD: 19.1 K/UL (ref 3.5–11.3)
WBC OTHER # BLD: 21.9 K/UL (ref 3.5–11.3)

## 2025-04-25 PROCEDURE — 80164 ASSAY DIPROPYLACETIC ACD TOT: CPT

## 2025-04-25 PROCEDURE — 80053 COMPREHEN METABOLIC PANEL: CPT

## 2025-04-25 PROCEDURE — 70450 CT HEAD/BRAIN W/O DYE: CPT

## 2025-04-25 PROCEDURE — 83605 ASSAY OF LACTIC ACID: CPT

## 2025-04-25 PROCEDURE — 6360000002 HC RX W HCPCS

## 2025-04-25 PROCEDURE — 99284 EMERGENCY DEPT VISIT MOD MDM: CPT

## 2025-04-25 PROCEDURE — 83735 ASSAY OF MAGNESIUM: CPT

## 2025-04-25 PROCEDURE — 85025 COMPLETE CBC W/AUTO DIFF WBC: CPT

## 2025-04-25 PROCEDURE — 96374 THER/PROPH/DIAG INJ IV PUSH: CPT

## 2025-04-25 RX ORDER — ONDANSETRON 2 MG/ML
4 INJECTION INTRAMUSCULAR; INTRAVENOUS ONCE
Status: COMPLETED | OUTPATIENT
Start: 2025-04-25 | End: 2025-04-25

## 2025-04-25 RX ADMIN — ONDANSETRON 4 MG: 2 INJECTION, SOLUTION INTRAMUSCULAR; INTRAVENOUS at 16:57

## 2025-04-25 ASSESSMENT — PAIN - FUNCTIONAL ASSESSMENT
PAIN_FUNCTIONAL_ASSESSMENT: NONE - DENIES PAIN
PAIN_FUNCTIONAL_ASSESSMENT: 0-10

## 2025-04-25 ASSESSMENT — ENCOUNTER SYMPTOMS
VOMITING: 1
SHORTNESS OF BREATH: 0
CHEST TIGHTNESS: 0
BACK PAIN: 0
ABDOMINAL PAIN: 0
WHEEZING: 0
NAUSEA: 1
CONSTIPATION: 0
DIARRHEA: 0

## 2025-04-25 ASSESSMENT — PAIN SCALES - GENERAL: PAINLEVEL_OUTOF10: 2

## 2025-04-25 NOTE — ED NOTES
The following labs labeled with pt sticker and sent to Lab:     [x] Lavender     [x] Blue   [x] Green  [x] Grey   [] Pink  [x] Red  [x] Yellow     [] Blood Cultures     [] COVID-19 swab    [] Rapid   [] Viral Swab  [] Wound Swab    [] Urine Sample  [] Pelvic Cultures

## 2025-04-25 NOTE — ED PROVIDER NOTES
[facility] EMERGENCY DEPARTMENT  eMERGENCY dEPARTMENT eNCOUnter      Pt Name: Kane Mijares  MRN: [id number]  Birthdate 1995  Date of evaluation: 4/25/2025  Provider: John Tellez PA-C    CHIEF COMPLAINT       Chief Complaint   Patient presents with    Seizures     2x today, one witnessed by mother and the other unwitnessed. Pt taking Depakote          HISTORY OF PRESENT ILLNESS  (Location/Symptom, Timing/Onset, Context/Setting, Quality, Duration, Modifying Factors, Severity.)   Kane Mijares is a 30 y.o. male who presents to the emergency department following two seizure at home. His mother is present and witnessed the seizures. She reports observing the patient in post ictal state with drowsiness and confusion. She continued to monitor the patient when he had a second tonic-clonic seizure. She reports the patient fell face forward into the ground before she was able to place him on his side. She did not note any loss of bladder control. Patient denies recent stressors or triggers.     Patient reports new onset of seizures in 2024. He has been following with neurology since. He reports well controlled condition in January 2025. His mother reports that the neurologist recently switched his AED from once per day to twice per day, the dose remained the same but is split at each frequency. His last seizure activity was in March 2025 with 3-4 seizures in one day which resulted in an admission to the hospital.      Patient follows with Dr. Martinez - neurology    Nursing Notes were reviewed.    ALLERGIES     Patient has no known allergies.    CURRENT MEDICATIONS       Previous Medications    AMLODIPINE (NORVASC) 5 MG TABLET    Take 1 tablet by mouth daily    ASPIRIN 81 MG EC TABLET    Take 1 tablet by mouth daily    DIVALPROEX (DEPAKOTE ER) 500 MG EXTENDED RELEASE TABLET    Take 2 tablets by mouth in the morning and at bedtime    HYDROXYZINE HCL (ATARAX) 25 MG TABLET    Take 1 tablet by mouth 3 times

## 2025-04-25 NOTE — DISCHARGE INSTRUCTIONS
Follow up with your PCP and neurologist for further evaluation and management. Return to ED if patient develops recurrent seizures, LOC, nausea, vomiting, numbness, weakness, confusion.

## 2025-04-25 NOTE — ED NOTES
Pt comes into ED with mother c/o seizures since this morning. Pt's mother reports that pt had one seizure this morning around 11:30 am that was unwitnessed and another one at 1:30 pm that was witnessed by his mother. Pt's mother states that she thought pt had first seizure when she went into his room and he was found snoring very loudly, which she states happens after pt has had a seizure. Pt's mother reports that pt was alert and talking a couple hours later and pt's mother states that pt was on his phone when he started seizing and falling forward, hitting his head on the floor. Pt reports that he has an aura before his seizure, he states that he senses an overwhelming feeling of anxiety prior to starting to seize. Pt has hx of seizures, states he is prescribed Depakote. Pt is alert and oriented, speaking in full and complete sentences with mother at bedside and call light within reach.

## 2025-04-25 NOTE — TELEPHONE ENCOUNTER
Pt mother Emilee called and stated she believe pt had a seizure because of the symptoms the pt experience. She said normally when the pt has a seizure his breathing is heaving, pt was snoring, pt woke out of his sleep warm and vomiting. Please advise.

## 2025-04-26 NOTE — ED PROVIDER NOTES
EMERGENCY DEPARTMENT ENCOUNTER   ATTENDING ATTESTATION     Pt Name: Kane Mijares  MRN: [id number]  Birthdate 1995  Date of evaluation: 4/25/25   Kane Mijares is a 30 y.o. male with CC: Seizures (2x today, one witnessed by mother and the other unwitnessed. Pt taking Depakote )    MDM:   I performed a substantive part of the MDM during the patient's E/M visit. I personally evaluated and examined the patient. I personally made or approved the documented management plan and acknowledge its risk of complications.    Case was signed out by myself pending labs.  White count has improved.  Lactic acid is normal.  Plan is for discharge with neurology follow-up.    CRITICAL CARE:       EKG: All EKG's are interpreted by the Emergency Department Physician who either signs or Co-signs this chart in the absence of a cardiologist.      RADIOLOGY:All plain film, CT, MRI, and formal ultrasound images (except ED bedside ultrasound) are read by the radiologist, see reports below, unless otherwise noted in MDM or here.  CT HEAD WO CONTRAST (Select for new onset seizures or head trauma)   Final Result   No acute intracranial abnormality.           LABS: All lab results were reviewed by myself, and all abnormals are listed below.  Labs Reviewed   CBC WITH AUTO DIFFERENTIAL - Abnormal; Notable for the following components:       Result Value    WBC 21.9 (*)     Hemoglobin 17.2 (*)     Neutrophils % 83 (*)     Lymphocytes % 8 (*)     Monocytes % 9 (*)     Eosinophils % 0 (*)     Neutrophils Absolute 18.18 (*)     Monocytes Absolute 1.97 (*)     All other components within normal limits   COMPREHENSIVE METABOLIC PANEL - Abnormal; Notable for the following components:    Glucose 105 (*)     All other components within normal limits   CBC WITH AUTO DIFFERENTIAL - Abnormal; Notable for the following components:    WBC 19.1 (*)     Neutrophils % 89 (*)     Lymphocytes % 3 (*)     Monocytes % 8 (*)     Eosinophils % 0 (*)     Neutrophils

## 2025-05-07 ENCOUNTER — OFFICE VISIT (OUTPATIENT)
Dept: NEUROLOGY | Age: 30
End: 2025-05-07

## 2025-05-07 VITALS
DIASTOLIC BLOOD PRESSURE: 98 MMHG | HEIGHT: 66 IN | BODY MASS INDEX: 41.16 KG/M2 | WEIGHT: 256.1 LBS | SYSTOLIC BLOOD PRESSURE: 144 MMHG

## 2025-05-07 DIAGNOSIS — G40.909 NONINTRACTABLE EPILEPSY WITHOUT STATUS EPILEPTICUS, UNSPECIFIED EPILEPSY TYPE (HCC): Primary | ICD-10-CM

## 2025-05-07 DIAGNOSIS — Z91.148 NON COMPLIANCE W MEDICATION REGIMEN: ICD-10-CM

## 2025-05-07 RX ORDER — LAMOTRIGINE 25 MG/1
25 TABLET ORAL 2 TIMES DAILY
Qty: 30 TABLET | Refills: 3 | Status: CANCELLED | OUTPATIENT
Start: 2025-05-07

## 2025-05-07 RX ORDER — TOPIRAMATE 25 MG/1
TABLET, FILM COATED ORAL
Qty: 60 TABLET | Refills: 2 | Status: SHIPPED | OUTPATIENT
Start: 2025-05-07 | End: 2025-07-13

## 2025-05-07 NOTE — PROGRESS NOTES
[address] [facility] [address]  [place] [address]  P: [phone]  F: [phone]    NEUROLOGY CLINIC NOTE     PATIENT NAME: Kane Mijares  PATIENT MRN: [id number]  PRIMARY CARE PHYSICIAN: No primary care provider on file.    HPI:      Kane Mijares, a 30-year-old male with a questionable seizure history, presented to clinic on March 17, 2025, for epilepsy follow-up after multiple recent admissions. In November 2024, he was hospitalized for breakthrough seizures related to nonadherence to Keppra, which was discontinued due to dosing difficulty and irritability. He was started on Depakote ER 1g nightly but had a subtherapeutic VPA level of 20. In December 2024, he had another seizure episode despite reporting adherence; VPA was therapeutic at 53, and his dose was maintained 1000 mg twice daily.  He reports that seizures occur under extreme anxiety. Multiple EEGs, including a 3-day study, showed no epileptiform activity. Given concern for functional vs. epileptic seizures, he was started on Zoloft and hydroxyzine, and a repeat EEG was planned in 2-4 weeks. On March 29, he had a breakthrough generalized tonic-clonic seizure with tongue bite and postictal confusion after missing several doses. He re-presented on April 25 with two more seizures and postictal symptoms; his valproic acid level was 107. He identified anxiety as a trigger and will start patient on topamax 25 twice daily for a week and then 50 twice daily with a plan to repeat EEG for 4 hours after 2 to 4 weeks in addition to continue Depakote 1000 twice daily    PATIENT HISTORY:     Past Medical History:   Diagnosis Date    Dog bite 11/2014    Wrist injury     injuried growth plate lt wrist        Past Surgical History:   Procedure Laterality Date    OTHER SURGICAL HISTORY Left 11/28/2014    I & D elbow s/p dog bite        Social History     Socioeconomic History    Marital status: Single     Spouse

## 2025-06-30 RX ORDER — HYDROXYZINE HYDROCHLORIDE 25 MG/1
TABLET, FILM COATED ORAL
Qty: 30 TABLET | Refills: 0 | OUTPATIENT
Start: 2025-06-30

## 2025-07-23 ENCOUNTER — OFFICE VISIT (OUTPATIENT)
Dept: PRIMARY CARE CLINIC | Age: 30
End: 2025-07-23

## 2025-07-23 VITALS
BODY MASS INDEX: 41.14 KG/M2 | HEART RATE: 61 BPM | OXYGEN SATURATION: 98 % | HEIGHT: 66 IN | WEIGHT: 256 LBS | DIASTOLIC BLOOD PRESSURE: 84 MMHG | SYSTOLIC BLOOD PRESSURE: 138 MMHG

## 2025-07-23 DIAGNOSIS — D72.829 LEUKOCYTOSIS, UNSPECIFIED TYPE: ICD-10-CM

## 2025-07-23 DIAGNOSIS — G40.909 NONINTRACTABLE EPILEPSY WITHOUT STATUS EPILEPTICUS, UNSPECIFIED EPILEPSY TYPE (HCC): ICD-10-CM

## 2025-07-23 DIAGNOSIS — Z13.9 DUE FOR SCREENING: ICD-10-CM

## 2025-07-23 DIAGNOSIS — Z76.89 ENCOUNTER TO ESTABLISH CARE: Primary | ICD-10-CM

## 2025-07-23 DIAGNOSIS — F41.1 GAD (GENERALIZED ANXIETY DISORDER): ICD-10-CM

## 2025-07-23 DIAGNOSIS — I10 PRIMARY HYPERTENSION: ICD-10-CM

## 2025-07-23 PROCEDURE — 99204 OFFICE O/P NEW MOD 45 MIN: CPT | Performed by: NURSE PRACTITIONER

## 2025-07-23 PROCEDURE — 3079F DIAST BP 80-89 MM HG: CPT | Performed by: NURSE PRACTITIONER

## 2025-07-23 PROCEDURE — 3075F SYST BP GE 130 - 139MM HG: CPT | Performed by: NURSE PRACTITIONER

## 2025-07-23 RX ORDER — HYDROXYZINE HYDROCHLORIDE 25 MG/1
25 TABLET, FILM COATED ORAL 3 TIMES DAILY PRN
Qty: 90 TABLET | Refills: 2 | Status: SHIPPED | OUTPATIENT
Start: 2025-07-23 | End: 2025-10-21

## 2025-07-23 RX ORDER — DIVALPROEX SODIUM 250 MG/1
TABLET, DELAYED RELEASE ORAL
COMMUNITY
Start: 2025-05-18 | End: 2025-07-23

## 2025-07-23 RX ORDER — HYDROXYZINE HYDROCHLORIDE 25 MG/1
25 TABLET, FILM COATED ORAL 3 TIMES DAILY PRN
Qty: 0.9 TABLET | Refills: 2 | Status: SHIPPED | OUTPATIENT
Start: 2025-07-23 | End: 2025-07-23 | Stop reason: SDUPTHER

## 2025-07-23 RX ORDER — ASPIRIN 81 MG/1
81 TABLET ORAL DAILY
Qty: 90 TABLET | Refills: 1 | Status: SHIPPED | OUTPATIENT
Start: 2025-07-23 | End: 2026-01-19

## 2025-07-23 RX ORDER — DIVALPROEX SODIUM 500 MG/1
1000 TABLET, FILM COATED, EXTENDED RELEASE ORAL 2 TIMES DAILY
Qty: 60 TABLET | Refills: 1 | Status: SHIPPED | OUTPATIENT
Start: 2025-07-23 | End: 2025-08-22

## 2025-07-23 RX ORDER — TOPIRAMATE 25 MG/1
TABLET, FILM COATED ORAL
Qty: 60 TABLET | Refills: 2 | Status: CANCELLED | OUTPATIENT
Start: 2025-07-23 | End: 2025-09-28

## 2025-07-23 ASSESSMENT — PATIENT HEALTH QUESTIONNAIRE - PHQ9
SUM OF ALL RESPONSES TO PHQ QUESTIONS 1-9: 0
2. FEELING DOWN, DEPRESSED OR HOPELESS: NOT AT ALL
SUM OF ALL RESPONSES TO PHQ QUESTIONS 1-9: 0
1. LITTLE INTEREST OR PLEASURE IN DOING THINGS: NOT AT ALL
SUM OF ALL RESPONSES TO PHQ QUESTIONS 1-9: 0
SUM OF ALL RESPONSES TO PHQ QUESTIONS 1-9: 0

## 2025-07-23 ASSESSMENT — ENCOUNTER SYMPTOMS
COLOR CHANGE: 0
SINUS PRESSURE: 0
BACK PAIN: 0
SINUS PAIN: 0
SORE THROAT: 0
PHOTOPHOBIA: 0
CHEST TIGHTNESS: 0
VOMITING: 0
NAUSEA: 0
ABDOMINAL PAIN: 0
SHORTNESS OF BREATH: 0
DIARRHEA: 0
COUGH: 0

## 2025-07-23 NOTE — PROGRESS NOTES
currently on any medication as he ran out of his previous prescription.  - Blood pressure today is 138/84 mmHg.  - No new medication will be started today, but blood pressure will be monitored during future visits.    4. Health maintenance.  - Liver function, kidney function, and thyroid levels are within normal limits.  - White blood cell count is consistently elevated, which could be a stress response.  - Due for diabetes and cholesterol screenings.  - A recheck of his CBC will be conducted to monitor his white blood cell count. Lab work will be ordered for cholesterol screening (fasting required), A1c, and a recheck of the CBC. The results will be communicated to him upon receipt.    Follow-up: A follow-up visit is scheduled in 3 months.  No results found for: \"CBCAUTODIF\", \"CMP\", \"LABA1C\", \"LIPIDPAN\"     Return in about 3 months (around 10/23/2025).  Orders Placed This Encounter   Procedures    CBC with Auto Differential     Standing Status:   Future     Expected Date:   7/23/2025     Expiration Date:   1/23/2026    Lipid Panel     Standing Status:   Future     Expected Date:   7/23/2025     Expiration Date:   7/23/2026     Is Patient Fasting?/# of Hours:   8-10     Has the patient fasted?:   Yes    Hemoglobin A1C     Standing Status:   Future     Expected Date:   7/23/2025     Expiration Date:   7/23/2026    HIV Screen     Standing Status:   Future     Expected Date:   7/23/2025     Expiration Date:   7/23/2026    Hepatitis C Antibody     Standing Status:   Future     Expected Date:   7/23/2025     Expiration Date:   7/23/2026    Valproic Acid Level, Total and Free     Standing Status:   Future     Expected Date:   7/23/2025     Expiration Date:   7/23/2026    TSH reflex to FT4     Standing Status:   Future     Expected Date:   7/23/2025     Expiration Date:   7/23/2026     Orders Placed This Encounter   Medications    hydrOXYzine HCl (ATARAX) 25 MG tablet     Sig: Take 1 tablet by mouth 3 times daily as needed

## 2025-07-23 NOTE — PATIENT INSTRUCTIONS
Neurology: please call to ensure they provide medication refills and to discuss brain fog with depakote     P: [phone] (Dr. Gerber)

## 2025-07-24 ENCOUNTER — HOSPITAL ENCOUNTER (EMERGENCY)
Age: 30
Discharge: HOME | End: 2025-07-24
Attending: STUDENT IN AN ORGANIZED HEALTH CARE EDUCATION/TRAINING PROGRAM

## 2025-07-24 ENCOUNTER — APPOINTMENT (OUTPATIENT)
Dept: CT IMAGING | Age: 30
End: 2025-07-24

## 2025-07-24 VITALS
DIASTOLIC BLOOD PRESSURE: 80 MMHG | TEMPERATURE: 98.4 F | OXYGEN SATURATION: 94 % | SYSTOLIC BLOOD PRESSURE: 142 MMHG | RESPIRATION RATE: 26 BRPM | HEART RATE: 69 BPM

## 2025-07-24 DIAGNOSIS — G40.919 BREAKTHROUGH SEIZURE (HCC): Primary | ICD-10-CM

## 2025-07-24 LAB
ALBUMIN SERPL-MCNC: 4.6 G/DL (ref 3.5–5.2)
ALBUMIN/GLOB SERPL: 1.3 {RATIO} (ref 1–2.5)
ALP SERPL-CCNC: 72 U/L (ref 40–129)
ALT SERPL-CCNC: 33 U/L (ref 10–50)
ANION GAP SERPL CALCULATED.3IONS-SCNC: 18 MMOL/L (ref 9–16)
AST SERPL-CCNC: 43 U/L (ref 10–50)
BASOPHILS # BLD: 0.09 K/UL (ref 0–0.2)
BASOPHILS NFR BLD: 0 % (ref 0–2)
BILIRUB SERPL-MCNC: 0.3 MG/DL (ref 0–1.2)
BUN SERPL-MCNC: 11 MG/DL (ref 6–20)
CALCIUM SERPL-MCNC: 9.2 MG/DL (ref 8.6–10.4)
CHLORIDE SERPL-SCNC: 101 MMOL/L (ref 98–107)
CO2 SERPL-SCNC: 19 MMOL/L (ref 20–31)
CREAT SERPL-MCNC: 0.9 MG/DL (ref 0.7–1.2)
EKG ATRIAL RATE: 93 BPM
EKG P AXIS: 30 DEGREES
EKG P-R INTERVAL: 158 MS
EKG Q-T INTERVAL: 358 MS
EKG QRS DURATION: 100 MS
EKG QTC CALCULATION (BAZETT): 445 MS
EKG R AXIS: -19 DEGREES
EKG T AXIS: 17 DEGREES
EKG VENTRICULAR RATE: 93 BPM
EOSINOPHIL # BLD: 0.03 K/UL (ref 0–0.44)
EOSINOPHILS RELATIVE PERCENT: 0 % (ref 1–4)
ERYTHROCYTE [DISTWIDTH] IN BLOOD BY AUTOMATED COUNT: 13 % (ref 11.8–14.4)
GFR, ESTIMATED: >90 ML/MIN/1.73M2
GLUCOSE SERPL-MCNC: 112 MG/DL (ref 74–99)
HCT VFR BLD AUTO: 50.3 % (ref 40.7–50.3)
HGB BLD-MCNC: 17.2 G/DL (ref 13–17)
IMM GRANULOCYTES # BLD AUTO: 0.12 K/UL (ref 0–0.3)
IMM GRANULOCYTES NFR BLD: 1 %
LYMPHOCYTES NFR BLD: 0.78 K/UL (ref 1.1–3.7)
LYMPHOCYTES RELATIVE PERCENT: 4 % (ref 24–43)
MCH RBC QN AUTO: 30.6 PG (ref 25.2–33.5)
MCHC RBC AUTO-ENTMCNC: 34.2 G/DL (ref 28.4–34.8)
MCV RBC AUTO: 89.5 FL (ref 82.6–102.9)
MONOCYTES NFR BLD: 1.11 K/UL (ref 0.1–1.2)
MONOCYTES NFR BLD: 6 % (ref 3–12)
NEUTROPHILS NFR BLD: 89 % (ref 36–65)
NEUTS SEG NFR BLD: 17.98 K/UL (ref 1.5–8.1)
NRBC BLD-RTO: 0 PER 100 WBC
PLATELET # BLD AUTO: 275 K/UL (ref 138–453)
PMV BLD AUTO: 9.6 FL (ref 8.1–13.5)
POTASSIUM SERPL-SCNC: 4.6 MMOL/L (ref 3.7–5.3)
PROT SERPL-MCNC: 8.1 G/DL (ref 6.6–8.7)
RBC # BLD AUTO: 5.62 M/UL (ref 4.21–5.77)
SODIUM SERPL-SCNC: 139 MMOL/L (ref 136–145)
VALPROATE SERPL-MCNC: 46 UG/ML (ref 50–125)
WBC OTHER # BLD: 20.1 K/UL (ref 3.5–11.3)

## 2025-07-24 PROCEDURE — 80053 COMPREHEN METABOLIC PANEL: CPT

## 2025-07-24 PROCEDURE — 6370000000 HC RX 637 (ALT 250 FOR IP): Performed by: STUDENT IN AN ORGANIZED HEALTH CARE EDUCATION/TRAINING PROGRAM

## 2025-07-24 PROCEDURE — 93005 ELECTROCARDIOGRAM TRACING: CPT

## 2025-07-24 PROCEDURE — 70450 CT HEAD/BRAIN W/O DYE: CPT

## 2025-07-24 PROCEDURE — 85025 COMPLETE CBC W/AUTO DIFF WBC: CPT

## 2025-07-24 PROCEDURE — 80164 ASSAY DIPROPYLACETIC ACD TOT: CPT

## 2025-07-24 PROCEDURE — 99284 EMERGENCY DEPT VISIT MOD MDM: CPT

## 2025-07-24 RX ORDER — BUTALBITAL, ACETAMINOPHEN AND CAFFEINE 50; 325; 40 MG/1; MG/1; MG/1
1 TABLET ORAL EVERY 4 HOURS PRN
Qty: 30 TABLET | Refills: 0 | Status: SHIPPED | OUTPATIENT
Start: 2025-07-24

## 2025-07-24 RX ORDER — BUTALBITAL, ACETAMINOPHEN AND CAFFEINE 50; 325; 40 MG/1; MG/1; MG/1
1 TABLET ORAL ONCE
Status: COMPLETED | OUTPATIENT
Start: 2025-07-24 | End: 2025-07-24

## 2025-07-24 RX ORDER — DIVALPROEX SODIUM 500 MG/1
500 TABLET, DELAYED RELEASE ORAL ONCE
Status: COMPLETED | OUTPATIENT
Start: 2025-07-24 | End: 2025-07-24

## 2025-07-24 RX ADMIN — BUTALBITAL, ACETAMINOPHEN, AND CAFFEINE 1 TABLET: 325; 50; 40 TABLET ORAL at 04:38

## 2025-07-24 RX ADMIN — DIVALPROEX SODIUM 500 MG: 500 TABLET, DELAYED RELEASE ORAL at 05:41

## 2025-07-24 ASSESSMENT — PAIN SCALES - GENERAL: PAINLEVEL_OUTOF10: 6

## 2025-07-24 NOTE — ED NOTES
Presents accompanied by mother with c/o seizures. Pts mother states when she got home from work at 2130 she caught the end of the pt having a seizure. States pt was confused when he came to and thought he may have fallen, but couldn't say definitively. Pts mother states pt had another witnessed seizure at 2330 and two more prior to arrival. Pt takes Depakote. A+Ox4.

## 2025-07-24 NOTE — DISCHARGE INSTRUCTIONS
If you take an anti-seizure medication, then take that medication as previously indicated and prescribed.  Do not miss any doses.    Do not drive any vehicles or operate any heavy machinery for a period of 6 months after having a seizure.  If you are caught driving and have had a seizure, then you could possible go to prison.    PLEASE RETURN TO THE EMERGENCY DEPARTMENT IMMEDIATELY for worsening symptoms, any seizure lasting for more than 5 minutes,  having multiple seizures in a row,  or if you develop any concerning symptoms such as: high fever not relieved by acetaminophen (Tylenol) and/or ibuprofen (Motrin / Advil), chills, shortness of breath, chest pain, feeling of your heart fluttering or racing, persistent nausea and/or vomiting, vomiting up blood, blood in your stool, loss of consciousness, numbness, weakness or tingling in the arms or legs or change in color of the extremities, changes in mental status, persistent headache, blurry vision loss of bladder / bowel control, unable to follow up with your physician, or other any other care or concern.

## 2025-07-28 NOTE — ED PROVIDER NOTES
[facility] EMERGENCY DEPARTMENT ENCOUNTER      Pt Name: Kane Mijares  MRN: [id number]  Birthdate 1995  Date of evaluation: 7/27/25    CHIEF COMPLAINT       Chief Complaint   Patient presents with    Seizures       HISTORY OF PRESENT ILLNESS   Kane Mijares is a 30 y.o. male who presents with reported seizure.  Reports seizure history.  Does take Depakote.  Was just seen at PCP yesterday.  Denied any new medications.  Reports patient is slowly improving to baseline.    PASTMEDICAL HISTORY     Past Medical History:   Diagnosis Date    Anxiety     Dog bite 11/01/2014    Hypertension     Seizures (HCC)     Wrist injury     injuried growth plate lt wrist     Past Problem List  Patient Active Problem List   Diagnosis Code    Anxiety F41.9    Unspecified mood (affective) disorder F39    Seizure-like activity (HCC) R56.9    Seizure (HCC) R56.9    Leukocytosis D72.829    Primary hypertension I10    Severe obesity (BMI >= 40) (HCC) E66.01    Spell of altered consciousness R40.4    Breakthrough seizure (HCC) G40.919    Non compliance w medication regimen Z91.148    Class 2 obesity with body mass index (BMI) of 37.0 to 37.9 in adult E66.812, Z68.37    Seizure secondary to subtherapeutic anticonvulsant medication (HCC) R56.9, Z79.899    Paroxysmal atrial fibrillation (HCC) I48.0       SURGICAL HISTORY       Past Surgical History:   Procedure Laterality Date    OTHER SURGICAL HISTORY Left 11/28/2014    I & D elbow s/p dog bite       CURRENT MEDICATIONS       Discharge Medication List as of 7/24/2025  5:32 AM        CONTINUE these medications which have NOT CHANGED    Details   divalproex (DEPAKOTE ER) 500 MG extended release tablet Take 2 tablets by mouth in the morning and at bedtime, Disp-60 tablet, R-1Normal      aspirin 81 MG EC tablet Take 1 tablet by mouth daily, Disp-90 tablet, R-1Normal      hydrOXYzine HCl (ATARAX) 25 MG tablet Take 1 tablet by mouth 3 times daily as needed for Anxiety, Disp-90 tablet, R-2Normal

## 2025-08-18 DIAGNOSIS — G40.909 NONINTRACTABLE EPILEPSY WITHOUT STATUS EPILEPTICUS, UNSPECIFIED EPILEPSY TYPE (HCC): ICD-10-CM

## 2025-08-18 RX ORDER — DIVALPROEX SODIUM 500 MG/1
1000 TABLET, FILM COATED, EXTENDED RELEASE ORAL 2 TIMES DAILY
Qty: 60 TABLET | Refills: 1 | Status: SHIPPED | OUTPATIENT
Start: 2025-08-18 | End: 2025-09-17

## 2025-08-28 ENCOUNTER — PATIENT MESSAGE (OUTPATIENT)
Dept: NEUROLOGY | Age: 30
End: 2025-08-28

## 2025-08-28 DIAGNOSIS — G40.909 NONINTRACTABLE EPILEPSY WITHOUT STATUS EPILEPTICUS, UNSPECIFIED EPILEPSY TYPE (HCC): ICD-10-CM

## 2025-08-28 RX ORDER — DIVALPROEX SODIUM 500 MG/1
1000 TABLET, FILM COATED, EXTENDED RELEASE ORAL 2 TIMES DAILY
Qty: 60 TABLET | Refills: 1 | Status: SHIPPED | OUTPATIENT
Start: 2025-08-28 | End: 2025-09-27